# Patient Record
Sex: FEMALE | Race: WHITE | NOT HISPANIC OR LATINO | Employment: UNEMPLOYED | URBAN - METROPOLITAN AREA
[De-identification: names, ages, dates, MRNs, and addresses within clinical notes are randomized per-mention and may not be internally consistent; named-entity substitution may affect disease eponyms.]

---

## 2017-09-11 PROBLEM — M54.16 LUMBAR RADICULOPATHY: Status: ACTIVE | Noted: 2017-09-11

## 2018-08-03 PROBLEM — M25.561 ACUTE PAIN OF RIGHT KNEE: Status: ACTIVE | Noted: 2018-08-03

## 2019-10-10 PROBLEM — E03.9 ACQUIRED HYPOTHYROIDISM: Status: ACTIVE | Noted: 2019-10-10

## 2019-10-10 PROBLEM — I36.1 NONRHEUMATIC TRICUSPID VALVE REGURGITATION: Status: ACTIVE | Noted: 2019-10-10

## 2019-10-10 PROBLEM — Z78.9 STATIN INTOLERANCE: Status: ACTIVE | Noted: 2019-10-10

## 2019-10-10 PROBLEM — Z87.898 HISTORY OF SEIZURE: Status: ACTIVE | Noted: 2019-10-10

## 2019-10-10 PROBLEM — I10 ESSENTIAL HYPERTENSION: Status: ACTIVE | Noted: 2019-10-10

## 2019-10-10 PROBLEM — M41.9 SCOLIOSIS OF THORACOLUMBAR SPINE: Status: ACTIVE | Noted: 2019-10-10

## 2019-10-10 PROBLEM — F32.9 REACTIVE DEPRESSION: Status: ACTIVE | Noted: 2019-10-10

## 2019-10-10 PROBLEM — E78.00 HYPERCHOLESTEROLEMIA: Status: ACTIVE | Noted: 2019-10-10

## 2020-01-02 PROBLEM — C50.911 INVASIVE DUCTAL CARCINOMA OF BREAST, FEMALE, RIGHT: Status: ACTIVE | Noted: 2020-01-02

## 2020-01-21 PROBLEM — C50.411 MALIGNANT NEOPLASM OF UPPER-OUTER QUADRANT OF RIGHT FEMALE BREAST: Status: ACTIVE | Noted: 2020-01-21

## 2020-02-13 ENCOUNTER — TELEPHONE (OUTPATIENT)
Dept: HEMATOLOGY/ONCOLOGY | Facility: CLINIC | Age: 60
End: 2020-02-13

## 2020-02-16 PROBLEM — N61.0 INFECTION OF RIGHT BREAST: Status: ACTIVE | Noted: 2020-02-16

## 2020-02-20 PROBLEM — N64.89 SEROMA OF BREAST: Status: ACTIVE | Noted: 2020-02-20

## 2020-03-23 ENCOUNTER — OFFICE VISIT (OUTPATIENT)
Dept: HEMATOLOGY/ONCOLOGY | Facility: CLINIC | Age: 60
End: 2020-03-23
Payer: COMMERCIAL

## 2020-03-23 DIAGNOSIS — D64.9 ANEMIA, UNSPECIFIED TYPE: ICD-10-CM

## 2020-03-23 DIAGNOSIS — D75.839 THROMBOCYTOSIS: ICD-10-CM

## 2020-03-23 DIAGNOSIS — Z17.0 MALIGNANT NEOPLASM OF UPPER-OUTER QUADRANT OF RIGHT BREAST IN FEMALE, ESTROGEN RECEPTOR POSITIVE: Primary | ICD-10-CM

## 2020-03-23 DIAGNOSIS — C50.411 MALIGNANT NEOPLASM OF UPPER-OUTER QUADRANT OF RIGHT BREAST IN FEMALE, ESTROGEN RECEPTOR POSITIVE: Primary | ICD-10-CM

## 2020-03-23 PROCEDURE — 99205 OFFICE O/P NEW HI 60 MIN: CPT | Mod: 95,,, | Performed by: INTERNAL MEDICINE

## 2020-03-23 PROCEDURE — 99205 PR OFFICE/OUTPT VISIT, NEW, LEVL V, 60-74 MIN: ICD-10-PCS | Mod: 95,,, | Performed by: INTERNAL MEDICINE

## 2020-03-23 RX ORDER — ANASTROZOLE 1 MG/1
1 TABLET ORAL DAILY
Qty: 90 TABLET | Refills: 3 | Status: SHIPPED | OUTPATIENT
Start: 2020-03-23 | End: 2020-05-18

## 2020-03-23 NOTE — PROGRESS NOTES
"  INITIAL CONSULTATION     DATE: 03/23/2020  Patient Name: Ricardo Perera  Reason for referral:  Breast cancer    The patient location is:  home  The chief complaint leading to consultation is:  New breast cancer diagnosis, initial consult  Visit type: Virtual visit with synchronous audio and video  Total time spent with patient:  60 min  Each patient to whom he or she provides medical services by telemedicine is:  (1) informed of the relationship between the physician and patient and the respective role of any other health care provider with respect to management of the patient; and (2) notified that he or she may decline to receive medical services by telemedicine and may withdraw from such care at any time.        Subjective:       Patient ID: Ricardo Perera is a 59 y.o. female.      [facility]       Oncology history for review prior to visit:   11/25/2019 mammogram right breast shows 15 mm equal density irregularly-shaped mass with indistinct margins.  Right breast ultrasound shows 15 x 9 x 11 mm irregular shaped hypoechoic mass with angular margins.    11/26/2019-right breast core needle biopsy   Pathology:  Invasive duct carcinoma intermediate grade.   estrogen receptor 99.5% positive, AZ in 98.8% positive, Ki 67 low.  HER2 negative     12/23/2019-[facility] diagnostic testing results-MUTYH heterozygous gene positive   (one daughter positive for this gene age 28 and 34 negative).   12/27/2019-MRI breast bilateral-right breast shows 1.4 x 1.0 x 1.1 cm mass at posterior 9:00 a.m..  Left breast shows no evidence of malignancy  01/02/2020-PCP visit-review of clinic visit shows "Pt has been under anesthesia before for a 9 hour back surgery and tolerated well. Pt has stress test and echo previously that was normal prior to that surgery per pt report. (Do not have this record on chart).    Pt is not on aspirin but takes BC power for headaches.   + family Hx mother and Father CAD at 59 years old and father had bypass surgery. " "  Pt has appt to see GI. Due to + MUTYH for a screening colonoscopy. Pt is negative for BRCA. "    01/21/2020-right breast nipple sparing mastectomy and right axillary sentinel lymph node excision, left breast nipple sparing mastectomy   Pathology:  Right breast In situ and invasive duct carcinoma 0.8 cm low grade.  Margins negative for tumor.  One lymph node no tumor seen.  PT1b pN0    Left breast shows invasive duct carcinoma 0.12 cm upper outer quadrant, margins negative.  Focal ALH.  pT1a pNx  01/21/2020-ER positive 99.5% NM positive 99.5% HER2 negative.  Ki 67 is 6.5%, favorable  Implants were done. She had surgery that night as well for hematoma. Left breast    02/03/20204748-Riyyfkge-krhqpintsf score result 11, distant recurrence risk at 9 years with AI or tamoxifen alone is 3% an average absolute chemotherapy benefit is less than 1%. (oncotype sent on initial dx right breast. Left breast incidental finding malignancy very small, Presented in tumor board, in agreement omit SLND).  02/07/2020 surgery postop visit-    2/16/20 - plastic surgery visit, red swollen breast - had already been on abx since 1/21 - her drains had been in three weeks per her report - she was then placed on 3 types of abx and was admitted to hospital and given IV abx and had surgery - cultures on 2/18 grew finegoldia magna. Replaced the implant - Right    03/23/2020-initial medical oncology consult  She confirms hx above. She felt mass on SBE by accident just prior to her MMG diag above.   Reports just finishing abx recent    PMH - lost weight 2019 from anxiety with daughter getting .she lost 15lbs. She went to PCP and they did eval for unintentional weight loss - Ct a/p, LN identfied in neck and saw specialists (and those have been presnet 4 years) and thought benign.   She is very active due to handicap daughter.     HTN, kideny stones X 3 and lithotripsy, cholesterol.     Has back "problem", had "T lift" surgery, lamniecotmy and " "fusion in 12/2014, 10+ "cage and screws, plates") - still has pain and has back curve in her back, she refused surgery.     She has a knee with severe OA, R, R hip pain result of how she walks from the knee.   She is told needs TKA. She has seen Dr. Tavarez and recommended for TKA.  She takes pain med for this - norco - Dr. Christianson - shots in joints.   Daily she is not in "terrible pain" but often, and has to rest more.     No hysterectomy. Went through menopause -   She was on BC that stopped periods so hard for her to say. Stopped BC age 58yo and then placed on HRT - this was stopped when saw surgeon. She reports some menopausal symptoms, jiménez. Hot flashes middle of the night.   She was told her ovaries are normal for her age on recent imaging.     HA mild chronic unchanged.   No GI complaint except on abx recent diarrhea.     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 They just got lift they are hioping    Sometimes few days of exacerbation - needs ice and heat when this occurs.    She goes to pain doctor, Dr. Christianson, who gives injections and pain meds. She is told this is going to hurt no matter waht.     lexapro routine and ativan prn not often.     37yo is paraplegic and she is primary caregiver. Lives with hsuband and daughter she cares for  No smoking or drinking.   No family history colon ca or polyps.   2 paternal aunts had breast cancer.   See gene testing above. Discussed in detail and counseling.      Past Medical History:   Diagnosis Date    Anemia     Anxiety     Arthralgia     Chest pain     Depression     Encounter for blood transfusion     H/O bronchitis     History of kidney stones     Hyperlipidemia     Hypertension     Hypothyroidism     PONV (postoperative nausea and vomiting)     Renal tubular acidosis 2016    Scoliosis     Seizures     x 1 age 45 due to HBP    Stress fracture of foot     Tachycardia     TMJ (dislocation of " temporomandibular joint)      Past Surgical History:   Procedure Laterality Date    BACK SURGERY      TLIF    BREAST RECONSTRUCTION Bilateral 2020    Procedure: RECONSTRUCTION, BREAST - with Immediate Implants;  Surgeon: Lou Maurice MD;  Location: [facility] OR;  Service: Plastics;  Laterality: Bilateral;     SECTION      EVACUATION OF HEMATOMA Left 2020    Procedure: EVACUATION, HEMATOMA;  Surgeon: Lou Maurice MD;  Location: [facility];  Service: Plastics;  Laterality: Left;    EXTRACORPOREAL SHOCK WAVE LITHOTRIPSY      x3    INCISION AND DRAINAGE OF BREAST Right 2020    Procedure: INCISION AND DRAINAGE, BREAST;  Surgeon: Lou Maurice MD;  Location: [facility];  Service: Plastics;  Laterality: Right;    INJECTION OF ANESTHETIC AGENT AROUND NERVE Right 8/3/2018    Procedure: BLOCK, NERVE/ Geniculate, Knee;  Surgeon: Mike Christianson MD;  Location: [facility];  Service: Pain Management;  Laterality: Right;    INSERTION OF BREAST IMPLANT Right 2020    Procedure: INSERTION, BREAST IMPLANT;  Surgeon: Lou Maurice MD;  Location: [facility];  Service: Plastics;  Laterality: Right;    KIDNEY STONE SURGERY      KNEE ARTHROSCOPY      REMOVAL OF BREAST IMPLANT Right 2020    Procedure: REMOVAL, IMPLANT, BREAST;  Surgeon: Lou Maurice MD;  Location: [facility];  Service: Plastics;  Laterality: Right;    SENTINEL LYMPH NODE BIOPSY Right 2020    Procedure: BIOPSY, LYMPH NODE, SENTINEL;  Surgeon: Paty Cheek MD;  Location: [facility];  Service: General;  Laterality: Right;    SIMPLE MASTECTOMY Bilateral 2020    Procedure: MASTECTOMY, SIMPLE;  Surgeon: Paty Cheek MD;  Location: [facility];  Service: General;  Laterality: Bilateral;    TONSILLECTOMY       Social History     Socioeconomic History    Marital status:      Spouse name: Not on file    Number of children: Not on file    Years of education: Not on file    Highest education level: Not on  file   Occupational History    Not on file   Social Needs    Financial resource strain: Not on file    Food insecurity:     Worry: Not on file     Inability: Not on file    Transportation needs:     Medical: Not on file     Non-medical: Not on file   Tobacco Use    Smoking status: Never Smoker    Smokeless tobacco: Never Used   Substance and Sexual Activity    Alcohol use: No     Alcohol/week: 0.0 standard drinks    Drug use: Never    Sexual activity: Yes     Partners: Male     Birth control/protection: None, Post-menopausal   Lifestyle    Physical activity:     Days per week: Not on file     Minutes per session: Not on file    Stress: To some extent   Relationships    Social connections:     Talks on phone: Not on file     Gets together: Not on file     Attends Yazdanism service: Not on file     Active member of club or organization: Not on file     Attends meetings of clubs or organizations: Not on file     Relationship status: Not on file   Other Topics Concern    Not on file   Social History Narrative    Not on file     Family History   Problem Relation Age of Onset    Hyperlipidemia Mother     Heart disease Mother     COPD Mother     Lung cancer Mother     Depression Father     Hyperlipidemia Father     Hypertension Father     Nephrolithiasis Father     Kidney disease Father     Heart disease Father     Cancer Paternal Aunt     Breast cancer Paternal Aunt 33    Ovarian cancer Maternal Aunt 73    Cancer Maternal Aunt     Breast cancer Paternal Aunt 68    Cancer Paternal Aunt        Current Outpatient Medications   Medication Sig Dispense Refill    doxycycline (VIBRA-TABS) 100 MG tablet Take 1 tablet (100 mg total) by mouth every 12 (twelve) hours. 28 tablet 0    escitalopram oxalate (LEXAPRO) 20 MG tablet TAKE 1 TABLET BY MOUTH EVERY DAY 90 tablet 4    gabapentin (NEURONTIN) 300 MG capsule TAKE ONE CAPSULE BY MOUTH EVERY EVENING. 1 TO 2 CAPSULES EVERY NIGHT AT BEDTIME 90 capsule  "0    hydroCHLOROthiazide (MICROZIDE) 12.5 mg capsule TAKE 1 CAPSULE(12.5 MG) BY MOUTH EVERY DAY 90 capsule 0    hydrocodone-acetaminophen 10-325mg (NORCO)  mg Tab Take 1 tablet by mouth every 6 (six) hours as needed for Pain. 60 tablet 0    irbesartan (AVAPRO) 150 MG tablet TAKE 1/2 TABLET BY MOUTH EVERY DAY AND 1 TABLET EVERY EVENING AS DIRECTED (Patient taking differently: Take 150 mg by mouth once daily. ) 135 tablet 3    L.acidophil,parac-S.therm-Bif. (RISAQUAD) Cap capsule Take 1 capsule by mouth once daily.      levothyroxine (SYNTHROID) 25 MCG tablet TAKE 1 TABLET BY MOUTH EVERY DAY 90 tablet 4    LORazepam (ATIVAN) 0.5 MG tablet TAKE 1 TABLET BY MOUTH TWICE DAILY AS NEEDED FOR ANXIETY 60 tablet 3    mupirocin (BACTROBAN) 2 % ointment by Nasal route 2 (two) times daily. 1 Tube 0    ondansetron (ZOFRAN-ODT) 4 MG TbDL ONDANSETRON 4 MG TBDP      pravastatin (PRAVACHOL) 80 MG tablet TAKE 1 TABLET BY MOUTH EVERY NIGHT AT BEDTIME 90 tablet 3     No current facility-administered medications for this visit.      ALLERGIES REVIEWED    Review of Systems    Constitutional: Negative for activity change, appetite change, negative fatigue, fever and unexpected weight change.   HENT: Negative for mouth sores and sore throat.    Respiratory: Negative for cough and shortness of breath.    Cardiovascular: Negative for chest pain, palpitations and leg swelling.   Gastrointestinal: Negative for abdominal pain, constipation and diarrhea.   Genitourinary: Negative for dysuria and frequency.   Musculoskeletal: +for back pain and joint swelling. See HPI  Neurological: Negative for weakness, light-headedness and numbness.   Hematological: Does not bruise/bleed easily.   Skin: no rash, no lesions, no itching    Objective:    Height - 5'1"    LMP 12/12/2015 (Approximate) Comment: spotting    Wt Readings from Last 25 Encounters:   02/16/20 53.4 kg (117 lb 11.6 oz)   01/21/20 53.1 kg (117 lb 1 oz)   01/13/20 53.1 kg (117 " lb 1 oz)   01/02/20 53.1 kg (117 lb)   11/26/19 51.7 kg (114 lb)   11/25/19 54.4 kg (120 lb)   10/14/19 54.7 kg (120 lb 9.6 oz)   05/08/19 54 kg (119 lb 1.6 oz)   08/29/18 60.3 kg (133 lb)   07/30/18 60.3 kg (133 lb)   07/13/18 59.9 kg (132 lb)   09/11/17 59.9 kg (132 lb)   12/08/16 62.4 kg (137 lb 8 oz)   08/01/16 61.7 kg (136 lb)   12/14/15 62.7 kg (138 lb 3.2 oz)       Physical Exam    Constitutional: She is oriented to person, place, and time. No distress.   HENT:  Conjunctivae and EOM are normal. No asymmetry.  Neck: Normal range of motion. Neck supple.   Neurological: She is alert and oriented to person, place, and time. No cranial nerve deficit visible  Skin: no visible rash  Psychiatric: She has a normal mood and affect. Her speech is normal.     No results found for this or any previous visit (from the past 72 hour(s)).    Assessment:       1. Malignant neoplasm of upper-outer quadrant of right breast in female, estrogen receptor positive      Right breast - stage IA  Left breast - stage IA    ECOG SCORE         ECOG 1-2    Patient is a 59 y.o. year old female with new diagnosis bilateral breast cancer after right breast mass felt on SBE and identified on MMG/biopsy, now s/p Right mastectomy, and left breast cancer identified incidentally at time of prophylactic left mastectomy.       Discussed diagnosis, staging, receptor status, various treatments including surgery, chemotherapy, radiation, endocrine therapy. Discussed recommended treatment plan and benefits/risks, rationale for recommendations based on NCCN guidelines. Patient and family expressed understanding and questions answered to their satisfaction.    For this patient, low oncotype result on 8mm IDC of right breast which is good news.     Discussed pros/cons adjvuant AI minimum 5 years and re-eval, consider 10 years at that time and rationale.   Discussed side effects, benefit/risk of AI. Concern with her baseline chronic joint aches and pains,  she expressed good understanding of possible SE of AI and we will re-eval closely for arthrlagia  Discussed bone health and Vit D/calcium.     Discussed benefits of exercise and healthy body weight (BMI<25) in decreasing risk of breast cancer recurrence and advised exercise and diet, she expressed understanding.       Also reviewed her labs, thrombocytosis, anemia after surgery in detail and evaluation recommended, labs.     Discussed coping with cancer diagnosis and support given.     Discussed exercise, nutrition.           Plan:       Diagnoses and all orders for this visit:    Malignant neoplasm of upper-outer quadrant of right breast in female, estrogen receptor positive            Start arimidex    Labs to be done in next 1-3 mo pending covid-19 status - cbc, cmp, Vit D, ferritin, iron profile due to long standing thrombocytosis, anemia after surgery notable recent, Vit D screening start of AI    Start vit d/calcium OTC.     MD vis in 5-6 weeks telemed follow-up on arimidex start      Keep follow-ups with PCP, Gyn, surgery    Exercise/nutrition      Discussed plan above in great detail with patient and all questions answered to their satisfaction. Proceed with plan above.       Thank you for the referral. Please call with any questions.           Brandi Bolaños M.D.  Hematology Oncology  [facility]

## 2020-05-14 ENCOUNTER — TELEPHONE (OUTPATIENT)
Dept: HEMATOLOGY/ONCOLOGY | Facility: CLINIC | Age: 60
End: 2020-05-14

## 2020-05-14 NOTE — TELEPHONE ENCOUNTER
spoke to patient she is having lots of bone and waking up at night with numb hands.  Made appointment to see  On Monday.

## 2020-05-14 NOTE — TELEPHONE ENCOUNTER
----- Message from Cally John sent at 5/14/2020 12:14 PM CDT -----  Patient is calling regarding side effects of her medications.  Please meredith her at [phone].   Thank you!

## 2020-05-18 ENCOUNTER — OFFICE VISIT (OUTPATIENT)
Dept: HEMATOLOGY/ONCOLOGY | Facility: CLINIC | Age: 60
End: 2020-05-18
Payer: COMMERCIAL

## 2020-05-18 DIAGNOSIS — M19.90 OSTEOARTHRITIS, UNSPECIFIED OSTEOARTHRITIS TYPE, UNSPECIFIED SITE: ICD-10-CM

## 2020-05-18 DIAGNOSIS — C50.912 MALIGNANT NEOPLASM OF LEFT BREAST IN FEMALE, ESTROGEN RECEPTOR POSITIVE, UNSPECIFIED SITE OF BREAST: ICD-10-CM

## 2020-05-18 DIAGNOSIS — Z17.0 MALIGNANT NEOPLASM OF LEFT BREAST IN FEMALE, ESTROGEN RECEPTOR POSITIVE, UNSPECIFIED SITE OF BREAST: ICD-10-CM

## 2020-05-18 DIAGNOSIS — G89.29 CHRONIC LOW BACK PAIN WITHOUT SCIATICA, UNSPECIFIED BACK PAIN LATERALITY: ICD-10-CM

## 2020-05-18 DIAGNOSIS — T45.1X5A AROMATASE INHIBITOR-ASSOCIATED ARTHRALGIA: ICD-10-CM

## 2020-05-18 DIAGNOSIS — Z17.0 MALIGNANT NEOPLASM OF UPPER-OUTER QUADRANT OF RIGHT BREAST IN FEMALE, ESTROGEN RECEPTOR POSITIVE: Primary | ICD-10-CM

## 2020-05-18 DIAGNOSIS — M25.50 AROMATASE INHIBITOR-ASSOCIATED ARTHRALGIA: ICD-10-CM

## 2020-05-18 DIAGNOSIS — C50.411 MALIGNANT NEOPLASM OF UPPER-OUTER QUADRANT OF RIGHT BREAST IN FEMALE, ESTROGEN RECEPTOR POSITIVE: Primary | ICD-10-CM

## 2020-05-18 DIAGNOSIS — R53.83 FATIGUE, UNSPECIFIED TYPE: ICD-10-CM

## 2020-05-18 DIAGNOSIS — M54.50 CHRONIC LOW BACK PAIN WITHOUT SCIATICA, UNSPECIFIED BACK PAIN LATERALITY: ICD-10-CM

## 2020-05-18 PROCEDURE — 99024 PR POST-OP FOLLOW-UP VISIT: ICD-10-PCS | Mod: 95,,, | Performed by: INTERNAL MEDICINE

## 2020-05-18 PROCEDURE — 99214 PR OFFICE/OUTPT VISIT, EST, LEVL IV, 30-39 MIN: ICD-10-PCS | Mod: 95,,, | Performed by: INTERNAL MEDICINE

## 2020-05-18 PROCEDURE — 99214 OFFICE O/P EST MOD 30 MIN: CPT | Mod: 95,,, | Performed by: INTERNAL MEDICINE

## 2020-05-18 PROCEDURE — 99024 POSTOP FOLLOW-UP VISIT: CPT | Mod: 95,,, | Performed by: INTERNAL MEDICINE

## 2020-05-18 RX ORDER — EXEMESTANE 25 MG/1
25 TABLET ORAL DAILY
Qty: 30 TABLET | Refills: 3 | Status: SHIPPED | OUTPATIENT
Start: 2020-05-18 | End: 2021-06-28

## 2020-05-18 NOTE — PROGRESS NOTES
"FOLLOW-UP VISIT    Patient name: Ricardo Perera  Date: 5/18/20    TELEMEDICINE  The patient location is: home  The chief complaint leading to consultation is: breast ca surveillance, SE with AI  Visit type: Virtual visit with synchronous audio and video  Total time spent with patient: 25 min  Each patient to whom he or she provides medical services by telemedicine is:  (1) informed of the relationship between the physician and patient and the respective role of any other health care provider with respect to management of the patient; and (2) notified that he or she may decline to receive medical services by telemedicine and may withdraw from such care at any time.        Subjective:       Patient ID: Ricardo Perera is a 59 y.o. female.      HPI  Reports today started her AI severe shoulder pains then hips daily. This started 3 weeks after start of med.   Hands felt swollen and stiff and would have to do lots of activity    At night, arm going numb and tingling to hand and waking her.   She reports brain fog also, feels could be pandemic stress also.   She reports "bone deep pain" and spoke with pain mgmt doctor also.     She is not stop that medication just continues to feel notable pain daily interfering with quality of life in activities of daily life    She is not exercising, has tried to start walking without much success and plans are to get a bike and use the bike outside for exercise.  Positive reinforcement given we discussed in great detail how much this may benefit her including decrease her breast cancer risk of recurrence, increased muscle strength to support her joints and back in decreased pain, decreased risk of further side effect with aromatase inhibitor, mental health and stress relief    We reviewed her other medication options including alternative AI versus SERM, tamoxifen.  We reviewed her MUTYH gene mutation and she has anxiety about additional cancer risk, we reviewed increased risk of " "uterine cancer with tamoxifen and benefit risk ratio in the context of breast cancer and that specifically uterine cancer is not increased incidence with MUTYH gene mutation.      To review she cares for her daughter in home who is disabled, this continues.     See detailed oncology history below, updated with most recent scans, labs, pertinent medical history.      Invasive ductal carcinoma of breast, female, right    1/2/2020 Initial Diagnosis     Invasive ductal carcinoma of breast, female, right    Oncology history for review:   11/25/2019 mammogram right breast shows 15 mm equal density irregularly-shaped mass with indistinct margins.  Right breast ultrasound shows 15 x 9 x 11 mm irregular shaped hypoechoic mass with angular margins.     11/26/2019-right breast core needle biopsy              Pathology:  Invasive duct carcinoma intermediate grade.              estrogen receptor 99.5% positive, [place] in 98.8% positive, Ki 67 low.  HER2 negative      12/23/2019-[facility] diagnostic testing results-MUTYH heterozygous gene positive              (one daughter positive for this gene age 28 and 34 negative).   12/27/2019-MRI breast bilateral-right breast shows 1.4 x 1.0 x 1.1 cm mass at posterior 9:00 a.m..  Left breast shows no evidence of malignancy  01/02/2020-PCP visit-review of clinic visit shows "Pt has been under anesthesia before for a 9 hour back surgery and tolerated well. Pt has stress test and echo previously that was normal prior to that surgery per pt report.   Pt is not on aspirin but takes BC power for headaches.   + family Hx mother and Father CAD at 59 years old and father had bypass surgery.   Pt has appt to see GI. Due to + MUTYH for a screening colonoscopy. Pt is negative for BRCA. "     01/21/2020-right breast nipple sparing mastectomy and right axillary sentinel lymph node excision, left breast nipple sparing mastectomy              Pathology:  Right breast In situ and invasive duct carcinoma 0.8 cm " low grade.  Margins negative for tumor.  One lymph node no tumor seen.  pT1b pN0                          Left breast shows invasive duct carcinoma 0.12 cm upper outer quadrant, margins negative.  Focal ALH.  pT1a pNx  01/21/2020-ER positive 99.5% IN positive 99.5% HER2 negative.  Ki 67 is 6.5%, favorable  Implants were done. She had surgery that night as well for hematoma. Left breast     02/03/20207409-Uafquwmz-xjjhbudmyq score result 11, distant recurrence risk at 9 years with AI or tamoxifen alone is 3% an average absolute chemotherapy benefit is less than 1%. (oncotype sent on initial dx right breast. Left breast incidental finding malignancy very small, Presented in tumor board, in agreement omit SLND).  02/07/2020 surgery postop visit-     2/16/20 - plastic surgery visit, red swollen breast - had already been on abx since 1/21 - her drains had been in three weeks per her report - she was then placed on 3 types of abx and was admitted to hospital and given IV abx and had surgery - cultures on 2/18 grew finegoldia magna. Replaced the implant - Right     03/23/2020-initial medical oncology consult. Start AI  5/18/20 - med onc follow-up         I have reviewed my initial consult note with detailed PMH/ROS from initial encounter and all following progress notes.   Past medical, surgical, family, and social history were reviewed today and there are no changes of note unless mentioned in HPI.     MEDS and ALLERGIES were reviewed with patient and meds reconciled.     Fatigue  Continues, not worse    Weight/appetite  Good    Constitutional  Denies F/C    Pain  Yes see HPI   Sleep  Ok    Mucositis  Denies    Cardiovascular  Denies CP    Respiratory  Denies SOB    Nausea  Denies    BMs  Normal    GI  Denies abdominal pain    Bleeding  Denies epistaxis, hemoptysis, BRBPR, melena, hematuria or any other bleeding    Extremities  Denies edema    Skin/nail/hair  Denies toxicity    Neuropathy  Denies    Psych  In good spirits           Objective:      LMP 12/12/2015 (Approximate) Comment: spotting   Wt Readings from Last 30 Encounters:   02/16/20 53.4 kg (117 lb 11.6 oz)   01/21/20 53.1 kg (117 lb 1 oz)   01/13/20 53.1 kg (117 lb 1 oz)   01/02/20 53.1 kg (117 lb)   11/26/19 51.7 kg (114 lb)   11/25/19 54.4 kg (120 lb)   10/14/19 54.7 kg (120 lb 9.6 oz)   05/08/19 54 kg (119 lb 1.6 oz)   08/29/18 60.3 kg (133 lb)   07/30/18 60.3 kg (133 lb)   07/13/18 59.9 kg (132 lb)   09/11/17 59.9 kg (132 lb)   12/08/16 62.4 kg (137 lb 8 oz)   08/01/16 61.7 kg (136 lb)   12/14/15 62.7 kg (138 lb 3.2 oz)       Constitutional: Patient is oriented, No distress. Calm. Normal speech.  She is well appearing  HENT: NCAT, Conjunctivae and EOM are normal.   Neurological: patient is alert and oriented to person, place, and time. No cranial nerve deficit visible.  Skin: no visible rash  Psychiatric: patient has a normal mood and affect. Her speech is normal.         Assessment:       1. Malignant neoplasm of upper-outer quadrant of right breast in female, estrogen receptor positive    2. Aromatase inhibitor-associated arthralgia    3. Fatigue, unspecified type    4. Malignant neoplasm of left breast in female, estrogen receptor positive, unspecified site of breast    5. Osteoarthritis, unspecified osteoarthritis type, unspecified site    6. Chronic low back pain without sciatica, unspecified back pain laterality      Cancer Staging  Stage IA left breast  Stage IA right breast    ECOG 1-2     Patient is a 59 y.o. year old female with new diagnosis bilateral breast cancer after right breast mass felt on SBE and identified on MMG/biopsy, now s/p Right mastectomy, and left breast cancer identified incidentally at time of prophylactic left mastectomy.         Discussed diagnosis, staging, receptor status, various treatments including surgery, chemotherapy, radiation, endocrine therapy. Discussed recommended treatment plan and benefits/risks, rationale for  recommendations based on NCCN guidelines. Patient and family expressed understanding and questions answered to their satisfaction.     For this patient, low oncotype result on 8mm IDC of right breast which is good news.      Discussed pros/cons adjvuant AI minimum 5 years and re-eval, consider 10 years at that time and rationale.   Discussed side effects, benefit/risk of AI. Concern with her baseline chronic joint aches and pains, she expressed good understanding of possible SE of AI and we will re-eval closely for arthrlagia  Discussed bone health and Vit D/calcium.      Discussed benefits of exercise and healthy body weight (BMI<25) in decreasing risk of breast cancer recurrence and advised exercise and diet, she expressed understanding.        Also reviewed her labs, thrombocytosis, anemia after surgery in detail and evaluation recommended, labs.      Discussed coping with cancer diagnosis and support given.      Discussed exercise, nutrition.     Today 05/18/2020, patient is having intolerance to aromatase inhibitor affecting quality of life and ADLs, discussed in great detail in all other medication options and rationale and as it pertains to her personal risk of recurrence which is overall low but present.  Plan to stop Current AI followed by start exercise and rechallenge with aromasin in 3-4 weeks. She also hasn't obtained lab work we discussed due to pandemic, to eval her thrombocytosis/anemia, she will contact us when leaving home and feels comfortable doing so.      Discussed COVID-19 and social distancing in great detail, avoid all non-essential visits out of the home if possible and avoid sick contacts.         Plan:       Ricardo was seen today for follow-up and pain.    Diagnoses and all orders for this visit:    Malignant neoplasm of upper-outer quadrant of right breast in female, estrogen receptor positive  -     exemestane (AROMASIN) 25 mg tablet; Take 1 tablet (25 mg total) by mouth once  daily.    Aromatase inhibitor-associated arthralgia    Fatigue, unspecified type    Malignant neoplasm of left breast in female, estrogen receptor positive, unspecified site of breast    Osteoarthritis, unspecified osteoarthritis type, unspecified site    Chronic low back pain without sciatica, unspecified back pain laterality            Stop arimidex    Off med three weeks and start aromasin, sent to pharm.     Inc exercise - Exercise/nutrition important, reviewed    MD vis in 1st two weeks of July to review tolerance/side effects.     Continue current medications otherwise, reviewed.      Important to keep follow-up with PCP and pain mgmt and gyn and surgery.     Discussed plan above in great detail with patient and all questions answered to their satisfaction. Proceed with plan above.          Brandi Bolaños M.D.  Hematology Oncology  [facility]

## 2020-07-07 ENCOUNTER — OFFICE VISIT (OUTPATIENT)
Dept: HEMATOLOGY/ONCOLOGY | Facility: CLINIC | Age: 60
End: 2020-07-07
Payer: COMMERCIAL

## 2020-07-07 DIAGNOSIS — R53.83 FATIGUE, UNSPECIFIED TYPE: ICD-10-CM

## 2020-07-07 DIAGNOSIS — G56.03 BILATERAL CARPAL TUNNEL SYNDROME: ICD-10-CM

## 2020-07-07 DIAGNOSIS — M19.90 OSTEOARTHRITIS, UNSPECIFIED OSTEOARTHRITIS TYPE, UNSPECIFIED SITE: ICD-10-CM

## 2020-07-07 DIAGNOSIS — E61.1 IRON DEFICIENCY: ICD-10-CM

## 2020-07-07 DIAGNOSIS — Z17.0 MALIGNANT NEOPLASM OF LEFT BREAST IN FEMALE, ESTROGEN RECEPTOR POSITIVE, UNSPECIFIED SITE OF BREAST: ICD-10-CM

## 2020-07-07 DIAGNOSIS — C50.912 MALIGNANT NEOPLASM OF LEFT BREAST IN FEMALE, ESTROGEN RECEPTOR POSITIVE, UNSPECIFIED SITE OF BREAST: ICD-10-CM

## 2020-07-07 DIAGNOSIS — Z17.0 MALIGNANT NEOPLASM OF UPPER-OUTER QUADRANT OF RIGHT BREAST IN FEMALE, ESTROGEN RECEPTOR POSITIVE: Primary | ICD-10-CM

## 2020-07-07 DIAGNOSIS — M25.50 AROMATASE INHIBITOR-ASSOCIATED ARTHRALGIA: ICD-10-CM

## 2020-07-07 DIAGNOSIS — T45.1X5A AROMATASE INHIBITOR-ASSOCIATED ARTHRALGIA: ICD-10-CM

## 2020-07-07 DIAGNOSIS — C50.411 MALIGNANT NEOPLASM OF UPPER-OUTER QUADRANT OF RIGHT BREAST IN FEMALE, ESTROGEN RECEPTOR POSITIVE: Primary | ICD-10-CM

## 2020-07-07 PROCEDURE — 99215 PR OFFICE/OUTPT VISIT, EST, LEVL V, 40-54 MIN: ICD-10-PCS | Mod: 95,,, | Performed by: INTERNAL MEDICINE

## 2020-07-07 PROCEDURE — 99215 OFFICE O/P EST HI 40 MIN: CPT | Mod: 95,,, | Performed by: INTERNAL MEDICINE

## 2020-07-07 RX ORDER — HEPARIN 100 UNIT/ML
500 SYRINGE INTRAVENOUS
Status: CANCELLED | OUTPATIENT
Start: 2020-07-13

## 2020-07-07 RX ORDER — SODIUM CHLORIDE 0.9 % (FLUSH) 0.9 %
10 SYRINGE (ML) INJECTION
Status: CANCELLED | OUTPATIENT
Start: 2020-07-13

## 2020-07-07 NOTE — PROGRESS NOTES
"FOLLOW-UP VISIT    Patient name: Ricardo Perera  Date: 7/7/20    The patient location is: home  The chief complaint leading to consultation is: followup on AI, side effects    Visit type: audiovisual then converted to audio after poor connection.     50 minutes of total time spent on the encounter, which includes face to face time and non-face to face time preparing to see the patient (eg, review of tests), Obtaining and/or reviewing separately obtained history, Documenting clinical information in the electronic or other health record, Independently interpreting results (not separately reported) and communicating results to the patient/family/caregiver, or Care coordination (not separately reported).       Each patient to whom he or she provides medical services by telemedicine is:  (1) informed of the relationship between the physician and patient and the respective role of any other health care provider with respect to management of the patient; and (2) notified that he or she may decline to receive medical services by telemedicine and may withdraw from such care at any time.      Subjective:       Patient ID: Ricardo Perera is a 59 y.o. female.    Chief Complaint: follow-up on AI for breast ca, iron def new dx    HPI    On 5/18/20 - Reports today started her AI severe shoulder pains then hips daily. This started 3 weeks after start of med.   Hands felt swollen and stiff and would have to do lots of activity     At night, arm going numb and tingling to hand and waking her.   She reports brain fog also, feels could be pandemic stress also.   She reports "bone deep pain" and spoke with pain mgmt doctor also.    We then stopped AI, stop for three weeks, restart aromasin.    TODAY 07/07/2020 - has been on aromasin, symptoms recurred in 2 weeks.   She has carpal tunnel new diagnosis after orthoped visit today. Right hand more than left but both with symptoms. Has braces for both. Severe OA, she reports orthopedic " "surprised she could ambulate at all.  Also her chronic back issues.    Long discussion about this and anxiety about medical problems and cancer recurrence.     In regards to new dx iron def - she had iron def in past, 2009, had IV iron.  She recalls the following:  She reports severe low K, 1.9, couldn't move and went to ED - had replacement.   She reports she thinks this was related to a kidney problem.   She was then on oral potassium after this.   She then had iron infusions after this time.     See detailed oncology history below, updated with most recent scans, labs, pertinent medical history.   Oncology History   Invasive ductal carcinoma of breast, female, right   1/2/2020 Initial Diagnosis    Invasive ductal carcinoma of breast, female, right    Oncology history for review:   11/25/2019 mammogram right breast shows 15 mm equal density irregularly-shaped mass with indistinct margins.  Right breast ultrasound shows 15 x 9 x 11 mm irregular shaped hypoechoic mass with angular margins.     11/26/2019-right breast core needle biopsy              Pathology:  Invasive duct carcinoma intermediate grade.              estrogen receptor 99.5% positive, [place] in 98.8% positive, Ki 67 low.  HER2 negative      12/23/2019-[facility] diagnostic testing results-MUTYH heterozygous gene positive              (one daughter positive for this gene age 28 and 34 negative).   12/27/2019-MRI breast bilateral-right breast shows 1.4 x 1.0 x 1.1 cm mass at posterior 9:00 a.m..  Left breast shows no evidence of malignancy  01/02/2020-PCP visit-review of clinic visit shows "Pt has been under anesthesia before for a 9 hour back surgery and tolerated well. Pt has stress test and echo previously that was normal prior to that surgery per pt report.   Pt is not on aspirin but takes BC power for headaches.   + family Hx mother and Father CAD at 59 years old and father had bypass surgery.   Pt has appt to see GI. Due to + MUTYH for a screening " "colonoscopy. Pt is negative for BRCA. "     01/21/2020-right breast nipple sparing mastectomy and right axillary sentinel lymph node excision, left breast nipple sparing mastectomy              Pathology:  Right breast In situ and invasive duct carcinoma 0.8 cm low grade.  Margins negative for tumor.  One lymph node no tumor seen.  pT1b pN0                          Left breast shows invasive duct carcinoma 0.12 cm upper outer quadrant, margins negative.  Focal ALH.  pT1a pNx  01/21/2020-ER positive 99.5% [place] positive 99.5% HER2 negative.  Ki 67 is 6.5%, favorable  Implants were done. She had surgery that night as well for hematoma. Left breast     02/03/20203292-Ajwbnqqk-ncsymfxpei score result 11, distant recurrence risk at 9 years with AI or tamoxifen alone is 3% an average absolute chemotherapy benefit is less than 1%. (oncotype sent on initial dx right breast. Left breast incidental finding malignancy very small, Presented in tumor board, in agreement omit SLND).  02/07/2020 surgery postop visit-     2/16/20 - plastic surgery visit, red swollen breast - had already been on abx since 1/21 - her drains had been in three weeks per her report - she was then placed on 3 types of abx and was admitted to hospital and given IV abx and had surgery - cultures on 2/18 grew finegoldia magna. Replaced the implant - Right     03/23/2020-initial medical oncology consult. Start AI  5/18/20 - med onc follow-up         I have reviewed my initial consult note with detailed PMH/ROS from initial encounter and all following progress notes.   Past medical, surgical, family, and social history were reviewed today and there are no changes of note unless mentioned in HPI.     MEDS and ALLERGIES were reviewed with patient and meds reconciled.     Fatigue  Not severe but present    Weight/appetite  Good    Constitutional  Denies F/C    Pain  +   Sleep  Ok    Mucositis  Denies    Cardiovascular  Denies CP    Respiratory  Denies SOB    Nausea  " Denies    BMs  Normal    GI  Denies abdominal pain    Bleeding  Denies epistaxis, hemoptysis, BRBPR, melena, hematuria or any other bleeding    Extremities  Denies edema    Skin/nail/hair  Denies toxicity    Neuropathy  Denies    Psych  In good spirits    Misc  n/a    Exercise  Not current          Objective:      LMP 12/12/2015 (Approximate) Comment: spotting   Wt Readings from Last 30 Encounters:   02/16/20 53.4 kg (117 lb 11.6 oz)   01/21/20 53.1 kg (117 lb 1 oz)   01/13/20 53.1 kg (117 lb 1 oz)   01/02/20 53.1 kg (117 lb)   11/26/19 51.7 kg (114 lb)   11/25/19 54.4 kg (120 lb)   10/14/19 54.7 kg (120 lb 9.6 oz)   05/08/19 54 kg (119 lb 1.6 oz)   08/29/18 60.3 kg (133 lb)   07/30/18 60.3 kg (133 lb)   07/13/18 59.9 kg (132 lb)   09/11/17 59.9 kg (132 lb)   12/08/16 62.4 kg (137 lb 8 oz)   08/01/16 61.7 kg (136 lb)   12/14/15 62.7 kg (138 lb 3.2 oz)       General: Well-developed/ well nourished; comfortable; A&O x 3  HEENT: Normocephalic, atraumatic, anicteric sclera, no nasal discharge    NECK: no visible goiter  Respiratory: respirations are visibly unlabored; no audible wheezing  Cariovascular: no edema  Abdominal: non-distended  Musculoskeletal: visible Joints not swollen  Skin: no visible rashes or lesions  Neurological: grossly intact         CBC reviewed, noted are Hg improved, ferritin 4ng/mL.iron sat 16%.  Assessment:       1. Malignant neoplasm of upper-outer quadrant of right breast in female, estrogen receptor positive    2. Aromatase inhibitor-associated arthralgia    3. Fatigue, unspecified type    4. Malignant neoplasm of left breast in female, estrogen receptor positive, unspecified site of breast    5. Osteoarthritis, unspecified osteoarthritis type, unspecified site    6. Bilateral carpal tunnel syndrome    7. Iron deficiency      Cancer Staging  Stage IA left breast  Stage IA right breast     ECOG 2     Patient is a 59 y.o. year old female with new diagnosis bilateral breast cancer after right  breast mass felt on SBE and identified on MMG/biopsy, now s/p Right mastectomy, and left breast cancer identified incidentally at time of prophylactic left mastectomy.       Discussed diagnosis, staging, receptor status, various treatments including surgery, chemotherapy, radiation, endocrine therapy. Discussed recommended treatment plan and benefits/risks, rationale for recommendations based on NCCN guidelines. Patient and family expressed understanding and questions answered to their satisfaction.     For this patient, low oncotype result on 8mm IDC of right breast which is good news.      Discussed pros/cons adjvuant AI minimum 5 years and re-eval, consider 10 years at that time and rationale.   Discussed side effects, benefit/risk of AI. Concern with her baseline chronic joint aches and pains, she expressed good understanding of possible SE of AI and we will re-eval closely for arthrlagia  Discussed bone health and Vit D/calcium.      Discussed benefits of exercise and healthy body weight (BMI<25) in decreasing risk of breast cancer recurrence and advised exercise and diet, she expressed understanding.        Also reviewed her labs, thrombocytosis, anemia after surgery in detail and evaluation recommended, labs.      Discussed coping with cancer diagnosis and support given.      Discussed exercise, nutrition.      05/18/2020, patient is having intolerance to aromatase inhibitor affecting quality of life and ADLs, discussed in great detail in all other medication options and rationale and as it pertains to her personal risk of recurrence which is overall low but present.  Plan to stop current AI followed by start exercise and rechallenge with aromasin in 3-4 weeks. She also hasn't obtained lab work we discussed due to pandemic, to eval her thrombocytosis/anemia, she will contact us when leaving home and feels comfortable doing so.     7/7/20 - clinically remains HANANE, discussed intolerance to AI, new dx carpal  tunnel, multiple joints/back issues OA severe.   We discussed stop AI and replace iron and re-eval tolerance.   Discussed had GI SE with oral iron, recommend IV, she is in agreement  Long discussion about all of above, anxiety, coping, support given.        Discussed COVID-19 and social distancing in great detail, avoid all non-essential visits out of the home if possible and avoid sick contacts.       Plan:       Diagnoses and all orders for this visit:    Malignant neoplasm of upper-outer quadrant of right breast in female, estrogen receptor positive    Aromatase inhibitor-associated arthralgia    Fatigue, unspecified type    Malignant neoplasm of left breast in female, estrogen receptor positive, unspecified site of breast    Osteoarthritis, unspecified osteoarthritis type, unspecified site    Bilateral carpal tunnel syndrome    Iron deficiency    Other orders  -     ferumoxytoL (FERAHEME) 510 mg in dextrose 5 % 100 mL IVPB  -     heparin, porcine (PF) 100 unit/mL injection flush 500 Units  -     sodium chloride 0.9% flush 10 mL  -     sodium chloride 0.9% 100 mL flush bag            Iron infusion X 2, please schedule    MD vis in 4 weeks to follow-up and consider restart AI. Labs at that time - cbc, iron profile, ferritin.     Exercise/nutrition important, reviewed    Continue current medications, reviewed.      Important to keep follow-up with PCP and surgery.     Discussed plan above in great detail with patient and all questions answered to their satisfaction. Proceed with plan above.               Brandi Bolaños M.D.  Hematology Oncology  [facility]

## 2020-07-24 ENCOUNTER — INFUSION (OUTPATIENT)
Dept: INFUSION THERAPY | Facility: HOSPITAL | Age: 60
End: 2020-07-24
Attending: INTERNAL MEDICINE
Payer: COMMERCIAL

## 2020-07-24 VITALS
TEMPERATURE: 99 F | BODY MASS INDEX: 24.76 KG/M2 | DIASTOLIC BLOOD PRESSURE: 67 MMHG | RESPIRATION RATE: 18 BRPM | HEART RATE: 72 BPM | HEIGHT: 60 IN | SYSTOLIC BLOOD PRESSURE: 99 MMHG | WEIGHT: 126.13 LBS

## 2020-07-24 DIAGNOSIS — E61.1 IRON DEFICIENCY: Primary | ICD-10-CM

## 2020-07-24 PROCEDURE — 63600175 PHARM REV CODE 636 W HCPCS: Mod: JG,PN | Performed by: INTERNAL MEDICINE

## 2020-07-24 PROCEDURE — 25000003 PHARM REV CODE 250: Mod: PN | Performed by: INTERNAL MEDICINE

## 2020-07-24 PROCEDURE — 96365 THER/PROPH/DIAG IV INF INIT: CPT | Mod: PN

## 2020-07-24 RX ORDER — SODIUM CHLORIDE 0.9 % (FLUSH) 0.9 %
10 SYRINGE (ML) INJECTION
Status: DISCONTINUED | OUTPATIENT
Start: 2020-07-24 | End: 2020-07-24 | Stop reason: HOSPADM

## 2020-07-24 RX ORDER — HEPARIN 100 UNIT/ML
500 SYRINGE INTRAVENOUS
Status: CANCELLED | OUTPATIENT
Start: 2020-07-24

## 2020-07-24 RX ORDER — SODIUM CHLORIDE 0.9 % (FLUSH) 0.9 %
10 SYRINGE (ML) INJECTION
Status: CANCELLED | OUTPATIENT
Start: 2020-07-24

## 2020-07-24 RX ADMIN — SODIUM CHLORIDE: 9 INJECTION, SOLUTION INTRAVENOUS at 10:07

## 2020-07-24 RX ADMIN — FERUMOXYTOL 510 MG: 510 INJECTION INTRAVENOUS at 10:07

## 2020-07-24 NOTE — PLAN OF CARE
Problem: Adult Inpatient Plan of Care  Goal: Plan of Care Review  Outcome: Ongoing, Progressing     Pt tolerated feraheme this shift. VSS post 30 min observation. Calendar given and reviewed with pt. Pt is safe for discharge.

## 2020-07-27 ENCOUNTER — INFUSION (OUTPATIENT)
Dept: INFUSION THERAPY | Facility: HOSPITAL | Age: 60
End: 2020-07-27
Attending: INTERNAL MEDICINE
Payer: COMMERCIAL

## 2020-07-27 VITALS
HEIGHT: 60 IN | BODY MASS INDEX: 24.76 KG/M2 | RESPIRATION RATE: 18 BRPM | TEMPERATURE: 99 F | SYSTOLIC BLOOD PRESSURE: 98 MMHG | DIASTOLIC BLOOD PRESSURE: 63 MMHG | WEIGHT: 126.13 LBS | HEART RATE: 77 BPM

## 2020-07-27 DIAGNOSIS — E61.1 IRON DEFICIENCY: Primary | ICD-10-CM

## 2020-07-27 PROCEDURE — 63600175 PHARM REV CODE 636 W HCPCS: Mod: JG,PN | Performed by: INTERNAL MEDICINE

## 2020-07-27 PROCEDURE — A4216 STERILE WATER/SALINE, 10 ML: HCPCS | Mod: PN | Performed by: INTERNAL MEDICINE

## 2020-07-27 PROCEDURE — 25000003 PHARM REV CODE 250: Mod: PN | Performed by: INTERNAL MEDICINE

## 2020-07-27 PROCEDURE — 96365 THER/PROPH/DIAG IV INF INIT: CPT | Mod: PN

## 2020-07-27 RX ORDER — SODIUM CHLORIDE 0.9 % (FLUSH) 0.9 %
10 SYRINGE (ML) INJECTION
Status: DISCONTINUED | OUTPATIENT
Start: 2020-07-27 | End: 2020-07-27 | Stop reason: HOSPADM

## 2020-07-27 RX ORDER — HEPARIN 100 UNIT/ML
500 SYRINGE INTRAVENOUS
Status: CANCELLED | OUTPATIENT
Start: 2020-07-27

## 2020-07-27 RX ORDER — SODIUM CHLORIDE 0.9 % (FLUSH) 0.9 %
10 SYRINGE (ML) INJECTION
Status: CANCELLED | OUTPATIENT
Start: 2020-07-27

## 2020-07-27 RX ADMIN — SODIUM CHLORIDE: 9 INJECTION, SOLUTION INTRAVENOUS at 10:07

## 2020-07-27 RX ADMIN — Medication 10 ML: at 10:07

## 2020-07-27 RX ADMIN — FERUMOXYTOL 510 MG: 510 INJECTION INTRAVENOUS at 10:07

## 2020-08-04 ENCOUNTER — OFFICE VISIT (OUTPATIENT)
Dept: HEMATOLOGY/ONCOLOGY | Facility: CLINIC | Age: 60
End: 2020-08-04
Payer: COMMERCIAL

## 2020-08-04 ENCOUNTER — LAB VISIT (OUTPATIENT)
Dept: LAB | Facility: HOSPITAL | Age: 60
End: 2020-08-04
Attending: INTERNAL MEDICINE
Payer: COMMERCIAL

## 2020-08-04 VITALS
OXYGEN SATURATION: 100 % | BODY MASS INDEX: 23.67 KG/M2 | WEIGHT: 120.56 LBS | RESPIRATION RATE: 12 BRPM | TEMPERATURE: 98 F | SYSTOLIC BLOOD PRESSURE: 117 MMHG | HEIGHT: 60 IN | HEART RATE: 80 BPM | DIASTOLIC BLOOD PRESSURE: 92 MMHG

## 2020-08-04 DIAGNOSIS — Z17.0 MALIGNANT NEOPLASM OF UPPER-OUTER QUADRANT OF RIGHT BREAST IN FEMALE, ESTROGEN RECEPTOR POSITIVE: ICD-10-CM

## 2020-08-04 DIAGNOSIS — Z17.0 MALIGNANT NEOPLASM OF UPPER-OUTER QUADRANT OF RIGHT BREAST IN FEMALE, ESTROGEN RECEPTOR POSITIVE: Primary | ICD-10-CM

## 2020-08-04 DIAGNOSIS — C50.411 MALIGNANT NEOPLASM OF UPPER-OUTER QUADRANT OF RIGHT BREAST IN FEMALE, ESTROGEN RECEPTOR POSITIVE: Primary | ICD-10-CM

## 2020-08-04 DIAGNOSIS — M54.50 CHRONIC LOW BACK PAIN WITHOUT SCIATICA, UNSPECIFIED BACK PAIN LATERALITY: ICD-10-CM

## 2020-08-04 DIAGNOSIS — G56.03 BILATERAL CARPAL TUNNEL SYNDROME: ICD-10-CM

## 2020-08-04 DIAGNOSIS — T45.1X5A AROMATASE INHIBITOR-ASSOCIATED ARTHRALGIA: ICD-10-CM

## 2020-08-04 DIAGNOSIS — G89.29 CHRONIC LOW BACK PAIN WITHOUT SCIATICA, UNSPECIFIED BACK PAIN LATERALITY: ICD-10-CM

## 2020-08-04 DIAGNOSIS — R53.83 FATIGUE, UNSPECIFIED TYPE: ICD-10-CM

## 2020-08-04 DIAGNOSIS — C50.411 MALIGNANT NEOPLASM OF UPPER-OUTER QUADRANT OF RIGHT BREAST IN FEMALE, ESTROGEN RECEPTOR POSITIVE: ICD-10-CM

## 2020-08-04 DIAGNOSIS — M19.90 OSTEOARTHRITIS, UNSPECIFIED OSTEOARTHRITIS TYPE, UNSPECIFIED SITE: ICD-10-CM

## 2020-08-04 DIAGNOSIS — M25.50 AROMATASE INHIBITOR-ASSOCIATED ARTHRALGIA: ICD-10-CM

## 2020-08-04 LAB
BASOPHILS # BLD AUTO: 0.03 K/UL (ref 0–0.2)
BASOPHILS NFR BLD: 0.3 % (ref 0–1.9)
DIFFERENTIAL METHOD: ABNORMAL
EOSINOPHIL # BLD AUTO: 0.1 K/UL (ref 0–0.5)
EOSINOPHIL NFR BLD: 0.6 % (ref 0–8)
ERYTHROCYTE [DISTWIDTH] IN BLOOD BY AUTOMATED COUNT: 14.3 % (ref 11.5–14.5)
FERRITIN SERPL-MCNC: 227 NG/ML (ref 12–264)
HCT VFR BLD AUTO: 42.2 % (ref 37–48.5)
HGB BLD-MCNC: 13.1 G/DL (ref 12–16)
IMM GRANULOCYTES # BLD AUTO: 0.02 K/UL (ref 0–0.04)
IMM GRANULOCYTES NFR BLD AUTO: 0.2 % (ref 0–0.5)
IRON SATN MFR SERPL: >100 % (ref 20–50)
IRON SERPL-MCNC: 340 UG/DL (ref 30–160)
LYMPHOCYTES # BLD AUTO: 2.3 K/UL (ref 1–4.8)
LYMPHOCYTES NFR BLD: 26.7 % (ref 18–48)
MCH RBC QN AUTO: 26.6 PG (ref 27–31)
MCHC RBC AUTO-ENTMCNC: 31 G/DL (ref 32–36)
MCV RBC AUTO: 86 FL (ref 82–98)
MONOCYTES # BLD AUTO: 0.7 K/UL (ref 0.3–1)
MONOCYTES NFR BLD: 7.8 % (ref 4–15)
NEUTROPHILS # BLD AUTO: 5.6 K/UL (ref 1.8–7.7)
NEUTROPHILS NFR BLD: 64.4 % (ref 38–73)
NRBC BLD-RTO: 0 /100 WBC
PLATELET # BLD AUTO: 499 K/UL (ref 150–350)
PMV BLD AUTO: 9.7 FL (ref 9.2–12.9)
RBC # BLD AUTO: 4.92 M/UL (ref 4–5.4)
TOTAL IRON BINDING CAPACITY: 305 UG/DL (ref 265–497)
WBC # BLD AUTO: 8.73 K/UL (ref 3.9–12.7)

## 2020-08-04 PROCEDURE — 83540 ASSAY OF IRON: CPT | Mod: PN

## 2020-08-04 PROCEDURE — 3080F PR MOST RECENT DIASTOLIC BLOOD PRESSURE >= 90 MM HG: ICD-10-PCS | Mod: CPTII,S$GLB,, | Performed by: INTERNAL MEDICINE

## 2020-08-04 PROCEDURE — 82728 ASSAY OF FERRITIN: CPT | Mod: PN

## 2020-08-04 PROCEDURE — 36415 COLL VENOUS BLD VENIPUNCTURE: CPT | Mod: PN

## 2020-08-04 PROCEDURE — 3008F PR BODY MASS INDEX (BMI) DOCUMENTED: ICD-10-PCS | Mod: CPTII,S$GLB,, | Performed by: INTERNAL MEDICINE

## 2020-08-04 PROCEDURE — 99999 PR PBB SHADOW E&M-EST. PATIENT-LVL IV: CPT | Mod: PBBFAC,,, | Performed by: INTERNAL MEDICINE

## 2020-08-04 PROCEDURE — 3080F DIAST BP >= 90 MM HG: CPT | Mod: CPTII,S$GLB,, | Performed by: INTERNAL MEDICINE

## 2020-08-04 PROCEDURE — 85025 COMPLETE CBC W/AUTO DIFF WBC: CPT

## 2020-08-04 PROCEDURE — 99214 OFFICE O/P EST MOD 30 MIN: CPT | Mod: S$GLB,,, | Performed by: INTERNAL MEDICINE

## 2020-08-04 PROCEDURE — 3074F PR MOST RECENT SYSTOLIC BLOOD PRESSURE < 130 MM HG: ICD-10-PCS | Mod: CPTII,S$GLB,, | Performed by: INTERNAL MEDICINE

## 2020-08-04 PROCEDURE — 83540 ASSAY OF IRON: CPT

## 2020-08-04 PROCEDURE — 99999 PR PBB SHADOW E&M-EST. PATIENT-LVL IV: ICD-10-PCS | Mod: PBBFAC,,, | Performed by: INTERNAL MEDICINE

## 2020-08-04 PROCEDURE — 82728 ASSAY OF FERRITIN: CPT

## 2020-08-04 PROCEDURE — 3074F SYST BP LT 130 MM HG: CPT | Mod: CPTII,S$GLB,, | Performed by: INTERNAL MEDICINE

## 2020-08-04 PROCEDURE — 3008F BODY MASS INDEX DOCD: CPT | Mod: CPTII,S$GLB,, | Performed by: INTERNAL MEDICINE

## 2020-08-04 PROCEDURE — 85025 COMPLETE CBC W/AUTO DIFF WBC: CPT | Mod: PN

## 2020-08-04 PROCEDURE — 99214 PR OFFICE/OUTPT VISIT, EST, LEVL IV, 30-39 MIN: ICD-10-PCS | Mod: S$GLB,,, | Performed by: INTERNAL MEDICINE

## 2020-08-04 RX ORDER — LORAZEPAM 0.5 MG/1
1 TABLET ORAL
COMMUNITY
End: 2020-11-24

## 2020-08-04 RX ORDER — TAMOXIFEN CITRATE 20 MG/1
20 TABLET ORAL DAILY
Qty: 30 TABLET | Refills: 11 | Status: SHIPPED | OUTPATIENT
Start: 2020-08-04 | End: 2021-08-16

## 2020-08-04 RX ORDER — FERROUS SULFATE 325(65) MG
325 TABLET ORAL
COMMUNITY
End: 2021-06-28

## 2020-08-04 RX ORDER — ESCITALOPRAM OXALATE 20 MG/1
1 TABLET ORAL
COMMUNITY
End: 2020-08-04

## 2020-08-04 RX ORDER — HYDROMORPHONE HYDROCHLORIDE 4 MG/1
TABLET ORAL
COMMUNITY
Start: 2020-07-14 | End: 2024-03-29

## 2020-08-04 NOTE — PROGRESS NOTES
"FOLLOW-UP VISIT    Patient name: Ricardo Perera  Date: 8/4/20      Subjective:       Patient ID: Ricardo Perera is a 59 y.o. female.    Chief Complaint: No chief complaint on file.    HPI     Here after stopped AI severe side effects.   Did have iron infusion and feeling somewhat better    Multiple issues with joints continue  Back surgery recommended  Carpal tunnel bilateral also symptomatic.     Dr. Sammy stevens mgmt changed her to dilaudid due to pain so severe.     Discussed all this in great detail and failed AI trials, expect will continue high risk for notable SE.   She has some depression as would expect related to all of this, care for her daughter paralyzed, pandemic restrictions, fear of cancer recurrence.     1/2021 daughter expecting baby.    See detailed oncology history below, updated with most recent scans, labs, pertinent medical history.   Oncology History   Invasive ductal carcinoma of breast, female, right   1/2/2020 Initial Diagnosis    Invasive ductal carcinoma of breast, female, right    Oncology history for review:   11/25/2019 mammogram right breast shows 15 mm equal density irregularly-shaped mass with indistinct margins.  Right breast ultrasound shows 15 x 9 x 11 mm irregular shaped hypoechoic mass with angular margins.     11/26/2019-right breast core needle biopsy              Pathology:  Invasive duct carcinoma intermediate grade.              estrogen receptor 99.5% positive, LA in 98.8% positive, Ki 67 low.  HER2 negative      12/23/2019-[facility] diagnostic testing results-MUTYH heterozygous gene positive              (one daughter positive for this gene age 28 and 34 negative).   12/27/2019-MRI breast bilateral-right breast shows 1.4 x 1.0 x 1.1 cm mass at posterior 9:00 a.m..  Left breast shows no evidence of malignancy  01/02/2020-PCP visit-review of clinic visit shows "Pt has been under anesthesia before for a 9 hour back surgery and tolerated well. Pt has stress test and echo " "previously that was normal prior to that surgery per pt report.   Pt is not on aspirin but takes BC power for headaches.   + family Hx mother and Father CAD at 59 years old and father had bypass surgery.   Pt has appt to see GI. Due to + MUTYH for a screening colonoscopy. Pt is negative for BRCA. "     01/21/2020-right breast nipple sparing mastectomy and right axillary sentinel lymph node excision, left breast nipple sparing mastectomy              Pathology:  Right breast In situ and invasive duct carcinoma 0.8 cm low grade.  Margins negative for tumor.  One lymph node no tumor seen.  pT1b pN0                          Left breast shows invasive duct carcinoma 0.12 cm upper outer quadrant, margins negative.  Focal ALH.  pT1a pNx  01/21/2020-ER positive 99.5% IN positive 99.5% HER2 negative.  Ki 67 is 6.5%, favorable  Implants were done. She had surgery that night as well for hematoma. Left breast     02/03/20202157-Irzjpvxh-qotfxsuxju score result 11, distant recurrence risk at 9 years with AI or tamoxifen alone is 3% an average absolute chemotherapy benefit is less than 1%. (oncotype sent on initial dx right breast. Left breast incidental finding malignancy very small, Presented in tumor board, in agreement omit SLND).  02/07/2020 surgery postop visit-     2/16/20 - plastic surgery visit, red swollen breast - had already been on abx since 1/21 - her drains had been in three weeks per her report - she was then placed on 3 types of abx and was admitted to hospital and given IV abx and had surgery - cultures on 2/18 grew finegoldia magna. Replaced the implant - Right     03/23/2020-initial medical oncology consult. Start AI  5/18/20 - med onc follow-up - very poor tolerance stopped arimidex. Plan to start aromasin  7/2020 - poor tolerance aromasin.   8/4/20 - plan to start tamoxifen         I have reviewed my initial consult note with detailed PMH/ROS from initial encounter and all following progress notes.   Past " medical, surgical, family, and social history were reviewed today and there are no changes of note unless mentioned in HPI.     MEDS and ALLERGIES were reviewed with patient and meds reconciled.     Fatigue  yes    Weight/appetite  Good    Constitutional  Denies F/C    Pain  Notable diffuse   Sleep  Ok    Mucositis  Denies    Cardiovascular  Denies CP    Respiratory  Denies SOB    Nausea  Denies    BMs  Normal    GI  Denies abdominal pain    Bleeding  Denies epistaxis, hemoptysis, BRBPR, melena, hematuria or any other bleeding    Extremities  Denies edema    Skin/nail/hair  Denies toxicity    Neuropathy  Denies    Psych  depressed   Misc  n/a    Exercise  none          Objective:      BP (!) 117/92 (BP Location: Left arm, Patient Position: Sitting)   Pulse 80   Temp 97.8 °F (36.6 °C) (Temporal)   Resp 12   Ht 5' (1.524 m)   Wt 54.7 kg (120 lb 9.5 oz)   LMP 12/12/2015 (Approximate) Comment: spotting   SpO2 100%   BMI 23.55 kg/m²   Wt Readings from Last 30 Encounters:   08/04/20 54.7 kg (120 lb 9.5 oz)   07/27/20 57.2 kg (126 lb 1.7 oz)   07/24/20 57.2 kg (126 lb 1.7 oz)   02/16/20 53.4 kg (117 lb 11.6 oz)   01/21/20 53.1 kg (117 lb 1 oz)   01/13/20 53.1 kg (117 lb 1 oz)   01/02/20 53.1 kg (117 lb)   11/26/19 51.7 kg (114 lb)   11/25/19 54.4 kg (120 lb)   10/14/19 54.7 kg (120 lb 9.6 oz)   05/08/19 54 kg (119 lb 1.6 oz)   08/29/18 60.3 kg (133 lb)   07/30/18 60.3 kg (133 lb)   07/13/18 59.9 kg (132 lb)   09/11/17 59.9 kg (132 lb)   12/08/16 62.4 kg (137 lb 8 oz)   08/01/16 61.7 kg (136 lb)   12/14/15 62.7 kg (138 lb 3.2 oz)       Constitutional: Patient is oriented to person, place, and time. No distress. Well appaering  HENT: Mouth/Throat: Oropharynx is clear and moist. No oropharyngeal exudate.   Eyes: Conjunctivae and EOM are normal.   Neck: Normal range of motion. Neck supple.   Cardiovascular: Normal rate, regular rhythm, normal heart sounds and intact distal pulses.    Pulmonary/Chest: Effort normal and  breath sounds normal. no wheezes. no rales.    Abdominal: Soft. Bowel sounds are normal. Patient exhibits no distension. There is no tenderness.   Musculoskeletal: patient exhibits no edema or tenderness.   Lymphadenopathy:  patient  has no cervical adenopathy. no supraclavicular adenopathy. No axillary LAD.   Neurological: Patient is alert and oriented to person, place, and time. normal strength. No cranial nerve deficit or sensory deficit.   Skin: Skin is warm and dry. No rash noted. No erythema.   Psychiatric: Patient has a normal mood and affect. speech is normal.   Nursing note and vitals reviewed.        Assessment:       1. Malignant neoplasm of upper-outer quadrant of right breast in female, estrogen receptor positive    2. Aromatase inhibitor-associated arthralgia    3. Fatigue, unspecified type    4. Osteoarthritis, unspecified osteoarthritis type, unspecified site    5. Bilateral carpal tunnel syndrome    6. Chronic low back pain without sciatica, unspecified back pain laterality      Stage IA left breast  Stage IA right breast     ECOG 2     Patient is a 59 y.o. year old female with new diagnosis bilateral breast cancer after right breast mass felt on SBE and identified on MMG/biopsy, now s/p Right mastectomy, and left breast cancer identified incidentally at time of prophylactic left mastectomy.       Discussed diagnosis, staging, receptor status, various treatments including surgery, chemotherapy, radiation, endocrine therapy. Discussed recommended treatment plan and benefits/risks, rationale for recommendations based on NCCN guidelines. Patient and family expressed understanding and questions answered to their satisfaction.     For this patient, low oncotype result on 8mm IDC of right breast which is good news.      Discussed pros/cons adjvuant AI minimum 5 years and re-eval, consider 10 years at that time and rationale.   Discussed side effects, benefit/risk of AI. Concern with her baseline chronic  joint aches and pains, she expressed good understanding of possible SE of AI and we will re-eval closely for arthrlagia  Discussed bone health and Vit D/calcium.      Discussed benefits of exercise and healthy body weight (BMI<25) in decreasing risk of breast cancer recurrence and advised exercise and diet, she expressed understanding.        Also reviewed her labs, thrombocytosis, anemia after surgery in detail and evaluation recommended, labs.      Discussed coping with cancer diagnosis and support given.      Discussed exercise, nutrition.      05/18/2020, patient is having intolerance to aromatase inhibitor affecting quality of life and ADLs, discussed in great detail in all other medication options and rationale and as it pertains to her personal risk of recurrence which is overall low but present.  Plan to stop current AI followed by start exercise and rechallenge with aromasin in 3-4 weeks. She also hasn't obtained lab work we discussed due to pandemic, to eval her thrombocytosis/anemia, she will contact us when leaving home and feels comfortable doing so.      7/7/20 - clinically remains HANANE, discussed intolerance to AI, new dx carpal tunnel, multiple joints/back issues OA severe.   We discussed stop AI and replace iron and re-eval tolerance.   Discussed had GI SE with oral iron, recommend IV, she is in agreement  Long discussion about all of above, anxiety, coping, support given.     8/4/20 - reviewed again multiple pain arthrlagia joint back issues on second AI. Discussed in detail.  Discussed start tamoxifen, she is in agreement. Advised on SE/benefit/risk in detail.         Discussed COVID-19 and social distancing in great detail, avoid all non-essential visits out of the home if possible and avoid sick contacts.       Plan:       Diagnoses and all orders for this visit:    Malignant neoplasm of upper-outer quadrant of right breast in female, estrogen receptor positive  -     tamoxifen (NOLVADEX) 20 MG  Tab; Take 1 tablet (20 mg total) by mouth once daily.    Aromatase inhibitor-associated arthralgia    Fatigue, unspecified type    Osteoarthritis, unspecified osteoarthritis type, unspecified site    Bilateral carpal tunnel syndrome    Chronic low back pain without sciatica, unspecified back pain laterality            MD vis 4-6 weeks from now in person or telemed okay to review start of tamoxifen.     Exercise/nutrition important, reviewed    Continue current medications, reviewed.      Important to keep follow-up with PCP and pain mgmt and ortho.     Discussed plan above in great detail with patient and all questions answered to their satisfaction. Proceed with plan above.          Brandi Bolaños M.D.  Hematology Oncology  [facility]

## 2020-09-04 PROBLEM — C50.912 INFILTRATING DUCTAL CARCINOMA OF BREAST, LEFT: Status: ACTIVE | Noted: 2020-09-04

## 2020-09-23 ENCOUNTER — PATIENT MESSAGE (OUTPATIENT)
Dept: HEMATOLOGY/ONCOLOGY | Facility: CLINIC | Age: 60
End: 2020-09-23

## 2020-09-25 ENCOUNTER — PATIENT MESSAGE (OUTPATIENT)
Dept: HEMATOLOGY/ONCOLOGY | Facility: CLINIC | Age: 60
End: 2020-09-25

## 2020-09-29 PROBLEM — D05.12 DUCTAL CARCINOMA IN SITU (DCIS) OF LEFT BREAST: Status: ACTIVE | Noted: 2020-09-29

## 2020-09-29 PROBLEM — R59.0 AXILLARY LYMPHADENOPATHY: Status: ACTIVE | Noted: 2020-09-29

## 2020-09-29 PROBLEM — C50.911 INFILTRATING DUCTAL CARCINOMA OF BREAST, RIGHT: Status: ACTIVE | Noted: 2020-09-29

## 2020-10-07 ENCOUNTER — OFFICE VISIT (OUTPATIENT)
Dept: HEMATOLOGY/ONCOLOGY | Facility: CLINIC | Age: 60
End: 2020-10-07
Payer: COMMERCIAL

## 2020-10-07 ENCOUNTER — PATIENT MESSAGE (OUTPATIENT)
Dept: HEMATOLOGY/ONCOLOGY | Facility: CLINIC | Age: 60
End: 2020-10-07

## 2020-10-07 VITALS
TEMPERATURE: 98 F | SYSTOLIC BLOOD PRESSURE: 138 MMHG | DIASTOLIC BLOOD PRESSURE: 92 MMHG | BODY MASS INDEX: 24.15 KG/M2 | RESPIRATION RATE: 18 BRPM | WEIGHT: 123 LBS | HEART RATE: 94 BPM | OXYGEN SATURATION: 98 % | HEIGHT: 60 IN

## 2020-10-07 DIAGNOSIS — Z17.0 MALIGNANT NEOPLASM OF UPPER-OUTER QUADRANT OF RIGHT BREAST IN FEMALE, ESTROGEN RECEPTOR POSITIVE: Primary | ICD-10-CM

## 2020-10-07 DIAGNOSIS — R59.9 ENLARGED LYMPH NODE: ICD-10-CM

## 2020-10-07 DIAGNOSIS — Z79.810 USE OF TAMOXIFEN (NOLVADEX): ICD-10-CM

## 2020-10-07 DIAGNOSIS — C50.411 MALIGNANT NEOPLASM OF UPPER-OUTER QUADRANT OF RIGHT BREAST IN FEMALE, ESTROGEN RECEPTOR POSITIVE: Primary | ICD-10-CM

## 2020-10-07 DIAGNOSIS — R20.0 NUMBNESS AND TINGLING: ICD-10-CM

## 2020-10-07 DIAGNOSIS — R20.2 NUMBNESS AND TINGLING: ICD-10-CM

## 2020-10-07 PROCEDURE — 99999 PR PBB SHADOW E&M-EST. PATIENT-LVL IV: ICD-10-PCS | Mod: PBBFAC,,, | Performed by: INTERNAL MEDICINE

## 2020-10-07 PROCEDURE — 99999 PR PBB SHADOW E&M-EST. PATIENT-LVL IV: CPT | Mod: PBBFAC,,, | Performed by: INTERNAL MEDICINE

## 2020-10-07 PROCEDURE — 3008F PR BODY MASS INDEX (BMI) DOCUMENTED: ICD-10-PCS | Mod: CPTII,S$GLB,, | Performed by: INTERNAL MEDICINE

## 2020-10-07 PROCEDURE — 3075F PR MOST RECENT SYSTOLIC BLOOD PRESS GE 130-139MM HG: ICD-10-PCS | Mod: CPTII,S$GLB,, | Performed by: INTERNAL MEDICINE

## 2020-10-07 PROCEDURE — 99214 PR OFFICE/OUTPT VISIT, EST, LEVL IV, 30-39 MIN: ICD-10-PCS | Mod: S$GLB,,, | Performed by: INTERNAL MEDICINE

## 2020-10-07 PROCEDURE — 3075F SYST BP GE 130 - 139MM HG: CPT | Mod: CPTII,S$GLB,, | Performed by: INTERNAL MEDICINE

## 2020-10-07 PROCEDURE — 3008F BODY MASS INDEX DOCD: CPT | Mod: CPTII,S$GLB,, | Performed by: INTERNAL MEDICINE

## 2020-10-07 PROCEDURE — 3080F DIAST BP >= 90 MM HG: CPT | Mod: CPTII,S$GLB,, | Performed by: INTERNAL MEDICINE

## 2020-10-07 PROCEDURE — 99214 OFFICE O/P EST MOD 30 MIN: CPT | Mod: S$GLB,,, | Performed by: INTERNAL MEDICINE

## 2020-10-07 PROCEDURE — 3080F PR MOST RECENT DIASTOLIC BLOOD PRESSURE >= 90 MM HG: ICD-10-PCS | Mod: CPTII,S$GLB,, | Performed by: INTERNAL MEDICINE

## 2020-10-07 NOTE — PROGRESS NOTES
FOLLOW-UP VISIT    Patient name: Ricardo Perera  Date: 10/7/20      Subjective:       Patient ID: Ricardo Perera is a 59 y.o. female.    Chief Complaint: Follow-up    HPI   has started tamoxifen after poor tolerance AI likely related to multiple underlying joint issues.   Continues multiple severe joint issues prior to this.  Remains with severe numb tingling hands but overall dont feel tamoxifen contributing.     MRI c/t/l is pending  EMG to be done    Hot flashes but not severe, she feels tolerable    Recent biopsy pending LN axilla. Done friday    To review:     Here after stopped AI severe side effects.   Did have iron infusion and feeling somewhat better    Multiple issues with joints continue  Back surgery recommended  Carpal tunnel bilateral also symptomatic.     Dr. Sammy stevens mgmt changed her to dilaudid due to pain so severe.     Discussed all this in great detail and failed AI trials, expect will continue high risk for notable SE.   She has some depression as would expect related to all of this, care for her daughter paralyzed, pandemic restrictions, fear of cancer recurrence.     1/2021 daughter expecting baby.    See detailed oncology history below, updated with most recent scans, labs, pertinent medical history.   Oncology History   Invasive ductal carcinoma of breast, female, right   1/2/2020 Initial Diagnosis    Invasive ductal carcinoma of breast, female, right    Oncology history for review:   11/25/2019 mammogram right breast shows 15 mm equal density irregularly-shaped mass with indistinct margins.  Right breast ultrasound shows 15 x 9 x 11 mm irregular shaped hypoechoic mass with angular margins.     11/26/2019-right breast core needle biopsy              Pathology:  Invasive duct carcinoma intermediate grade.              estrogen receptor 99.5% positive, [place] in 98.8% positive, Ki 67 low.  HER2 negative      12/23/2019-[facility] diagnostic testing results-MUTYH heterozygous gene positive              " (one daughter positive for this gene age 28 and 34 negative).   12/27/2019-MRI breast bilateral-right breast shows 1.4 x 1.0 x 1.1 cm mass at posterior 9:00 a.m..  Left breast shows no evidence of malignancy  01/02/2020-PCP visit-review of clinic visit shows "Pt has been under anesthesia before for a 9 hour back surgery and tolerated well. Pt has stress test and echo previously that was normal prior to that surgery per pt report.   Pt is not on aspirin but takes BC power for headaches.   + family Hx mother and Father CAD at 59 years old and father had bypass surgery.   Pt has appt to see GI. Due to + MUTYH for a screening colonoscopy. Pt is negative for BRCA. "     01/21/2020-right breast nipple sparing mastectomy and right axillary sentinel lymph node excision, left breast nipple sparing mastectomy              Pathology:  Right breast In situ and invasive duct carcinoma 0.8 cm low grade.  Margins negative for tumor.  One lymph node no tumor seen.  pT1b pN0                          Left breast shows invasive duct carcinoma 0.12 cm upper outer quadrant, margins negative.  Focal ALH.  pT1a pNx  01/21/2020-ER positive 99.5% PA positive 99.5% HER2 negative.  Ki 67 is 6.5%, favorable  Implants were done. She had surgery that night as well for hematoma. Left breast     02/03/20203730-Umeuzjew-rxbrbmbufs score result 11, distant recurrence risk at 9 years with AI or tamoxifen alone is 3% an average absolute chemotherapy benefit is less than 1%. (oncotype sent on initial dx right breast. Left breast incidental finding malignancy very small, Presented in tumor board, in agreement omit SLND).  02/07/2020 surgery postop visit-     2/16/20 - plastic surgery visit, red swollen breast - had already been on abx since 1/21 - her drains had been in three weeks per her report - she was then placed on 3 types of abx and was admitted to hospital and given IV abx and had surgery - cultures on 2/18 grew finegoldia magna. Replaced the " implant - Right     03/23/2020-initial medical oncology consult. Start AI  5/18/20 - med onc follow-up - very poor tolerance stopped arimidex. Plan to start aromasin  7/2020 - poor tolerance aromasin.   8/4/20 - plan to start tamoxifen     9/4/2020 Cancer Staged    Staging form: Breast, AJCC 8th Edition  - Clinical: Stage IA (cT1c, cN0, cM0, G2, ER+, SD+, HER2-)     9/4/2020 Cancer Staged    Staging form: Breast, AJCC 8th Edition  - Pathologic: Stage IA (pT1b, pN0(sn), cM0, G2, ER+, SD+, HER2-, Oncotype DX score: 11)     Infiltrating ductal carcinoma of breast, left   9/4/2020 Initial Diagnosis    Infiltrating ductal carcinoma of breast, left     9/4/2020 Cancer Staged    Staging form: Breast, AJCC 8th Edition  - Pathologic: Stage Unknown (pT1a, pNX, cM0, G2, ER+, SD+)         I have reviewed my initial consult note with detailed PMH/ROS from initial encounter and all following progress notes.   Past medical, surgical, family, and social history were reviewed today and there are no changes of note unless mentioned in HPI.     MEDS and ALLERGIES were reviewed with patient and meds reconciled.     Fatigue  yes    Weight/appetite  Good    Constitutional  Denies F/C    Pain  Notable diffuse   Sleep  Ok    Mucositis  Denies    Cardiovascular  Denies CP    Respiratory  Denies SOB    Nausea  Denies    BMs  Normal    GI  Denies abdominal pain    Bleeding  Denies epistaxis, hemoptysis, BRBPR, melena, hematuria or any other bleeding    Extremities  Denies edema    Skin/nail/hair  Denies toxicity    Neuropathy  Denies    Psych  depressed   Misc  n/a    Exercise  none          Objective:      BP (!) 138/92 (BP Location: Right leg, Patient Position: Sitting, BP Method: Medium (Automatic))   Pulse 94   Temp 98.4 °F (36.9 °C) (Temporal)   Resp 18   Ht 5' (1.524 m)   Wt 55.8 kg (123 lb 0.3 oz)   LMP 12/12/2015 (Approximate) Comment: spotting   SpO2 98%   BMI 24.03 kg/m²   Wt Readings from Last 30 Encounters:   10/07/20 55.8  kg (123 lb 0.3 oz)   09/17/20 54.7 kg (120 lb 9.5 oz)   09/04/20 54.7 kg (120 lb 9.5 oz)   08/04/20 54.7 kg (120 lb 9.5 oz)   07/27/20 57.2 kg (126 lb 1.7 oz)   07/24/20 57.2 kg (126 lb 1.7 oz)   02/16/20 53.4 kg (117 lb 11.6 oz)   01/21/20 53.1 kg (117 lb 1 oz)   01/13/20 53.1 kg (117 lb 1 oz)   01/02/20 53.1 kg (117 lb)   11/26/19 51.7 kg (114 lb)   11/25/19 54.4 kg (120 lb)   10/14/19 54.7 kg (120 lb 9.6 oz)   05/08/19 54 kg (119 lb 1.6 oz)   08/29/18 60.3 kg (133 lb)   07/30/18 60.3 kg (133 lb)   07/13/18 59.9 kg (132 lb)   09/11/17 59.9 kg (132 lb)   12/08/16 62.4 kg (137 lb 8 oz)   08/01/16 61.7 kg (136 lb)   12/14/15 62.7 kg (138 lb 3.2 oz)     Physical exam is unchanged from prior  Constitutional: Patient is oriented to person, place, and time. No distress. Well appaering  HENT: Mouth/Throat: Oropharynx is clear and moist. No oropharyngeal exudate.   Eyes: Conjunctivae and EOM are normal.   Neck: Normal range of motion. Neck supple.   Cardiovascular: Normal rate, regular rhythm, normal heart sounds and intact distal pulses.    Pulmonary/Chest: Effort normal and breath sounds normal. no wheezes. no rales.    Abdominal: Soft. Bowel sounds are normal. Patient exhibits no distension. There is no tenderness.   Musculoskeletal: patient exhibits no edema or tenderness.   Lymphadenopathy:  patient  has no cervical adenopathy. no supraclavicular adenopathy. No axillary LAD.   Neurological: Patient is alert and oriented to person, place, and time. normal strength. No cranial nerve deficit or sensory deficit.   Skin: Skin is warm and dry. No rash noted. No erythema.   Psychiatric: Patient has a normal mood and affect. speech is normal.   Nursing note and vitals reviewed.        Assessment:       1. Malignant neoplasm of upper-outer quadrant of right breast in female, estrogen receptor positive    2. Enlarged lymph node    3. Use of tamoxifen (Nolvadex)    4. Numbness and tingling      Stage IA left breast  Stage IA  right breast     ECOG 2     Patient is a 59 y.o. year old female with new diagnosis bilateral breast cancer after right breast mass felt on SBE and identified on MMG/biopsy, now s/p Right mastectomy, and left breast cancer identified incidentally at time of prophylactic left mastectomy.       Discussed diagnosis, staging, receptor status, various treatments including surgery, chemotherapy, radiation, endocrine therapy. Discussed recommended treatment plan and benefits/risks, rationale for recommendations based on NCCN guidelines. Patient and family expressed understanding and questions answered to their satisfaction.     For this patient, low oncotype result on 8mm IDC of right breast which is good news.      Discussed pros/cons adjvuant AI minimum 5 years and re-eval, consider 10 years at that time and rationale.   Discussed side effects, benefit/risk of AI. Concern with her baseline chronic joint aches and pains, she expressed good understanding of possible SE of AI and we will re-eval closely for arthrlagia  Discussed bone health and Vit D/calcium.      Discussed benefits of exercise and healthy body weight (BMI<25) in decreasing risk of breast cancer recurrence and advised exercise and diet, she expressed understanding.        Also reviewed her labs, thrombocytosis, anemia after surgery in detail and evaluation recommended, labs.      Discussed coping with cancer diagnosis and support given.      Discussed exercise, nutrition.      05/18/2020, patient is having intolerance to aromatase inhibitor affecting quality of life and ADLs, discussed in great detail in all other medication options and rationale and as it pertains to her personal risk of recurrence which is overall low but present.  Plan to stop current AI followed by start exercise and rechallenge with aromasin in 3-4 weeks. She also hasn't obtained lab work we discussed due to pandemic, to eval her thrombocytosis/anemia, she will contact us when leaving  home and feels comfortable doing so.      7/7/20 - clinically remains HANANE, discussed intolerance to AI, new dx carpal tunnel, multiple joints/back issues OA severe.   We discussed stop AI and replace iron and re-eval tolerance.   Discussed had GI SE with oral iron, recommend IV, she is in agreement  Long discussion about all of above, anxiety, coping, support given.     8/4/20 - reviewed again multiple pain arthrlagia joint back issues on second AI. Discussed in detail.  Discussed start tamoxifen, she is in agreement. Advised on SE/benefit/risk in detail.     10/7/20 TODAY presents for f/u after start tamoxifen and overall tolerating well, reviewed ongoing med problems at length, recent bx expect benign and will follow-up, anxiety related to this and potential future surgeries. Hopefully she gets some relief soon in regards to multiple joint issues. Tamoxifen plan 5 years and re-eval        Discussed COVID-19 and social distancing in great detail, avoid all non-essential visits out of the home if possible and avoid sick contacts.       Plan:       Ricardo was seen today for follow-up.    Diagnoses and all orders for this visit:    Malignant neoplasm of upper-outer quadrant of right breast in female, estrogen receptor positive    Enlarged lymph node    Use of tamoxifen (Nolvadex)    Numbness and tingling            Will follow-up with pathology result from recent LN excision    MD vis 4 months for eval on tamoxifen.     MD vis 4-6 weeks from now in person or telemed okay to review start of tamoxifen.     Exercise/nutrition important, reviewed    Continue current medications, reviewed.      Important to keep follow-up with PCP and pain mgmt and ortho.     Discussed plan above in great detail with patient and all questions answered to their satisfaction. Proceed with plan above.          Brandi Bolaños M.D.  Hematology Oncology  [facility]

## 2020-10-12 PROBLEM — T79.2XXA POST-TRAUMATIC SEROMA: Status: ACTIVE | Noted: 2020-10-12

## 2021-02-08 ENCOUNTER — PATIENT MESSAGE (OUTPATIENT)
Dept: HEMATOLOGY/ONCOLOGY | Facility: CLINIC | Age: 61
End: 2021-02-08

## 2021-02-10 ENCOUNTER — TELEPHONE (OUTPATIENT)
Dept: HEMATOLOGY/ONCOLOGY | Facility: CLINIC | Age: 61
End: 2021-02-10

## 2021-03-01 ENCOUNTER — OFFICE VISIT (OUTPATIENT)
Dept: HEMATOLOGY/ONCOLOGY | Facility: CLINIC | Age: 61
End: 2021-03-01
Payer: COMMERCIAL

## 2021-03-01 VITALS
RESPIRATION RATE: 18 BRPM | WEIGHT: 128.5 LBS | TEMPERATURE: 98 F | SYSTOLIC BLOOD PRESSURE: 130 MMHG | HEIGHT: 60 IN | OXYGEN SATURATION: 96 % | BODY MASS INDEX: 25.23 KG/M2 | HEART RATE: 88 BPM | DIASTOLIC BLOOD PRESSURE: 78 MMHG

## 2021-03-01 DIAGNOSIS — M19.90 OSTEOARTHRITIS, UNSPECIFIED OSTEOARTHRITIS TYPE, UNSPECIFIED SITE: ICD-10-CM

## 2021-03-01 DIAGNOSIS — G56.03 BILATERAL CARPAL TUNNEL SYNDROME: ICD-10-CM

## 2021-03-01 DIAGNOSIS — Z79.810 USE OF TAMOXIFEN (NOLVADEX): ICD-10-CM

## 2021-03-01 DIAGNOSIS — R20.0 NUMBNESS AND TINGLING: ICD-10-CM

## 2021-03-01 DIAGNOSIS — Z17.0 MALIGNANT NEOPLASM OF UPPER-OUTER QUADRANT OF RIGHT BREAST IN FEMALE, ESTROGEN RECEPTOR POSITIVE: Primary | ICD-10-CM

## 2021-03-01 DIAGNOSIS — R20.2 NUMBNESS AND TINGLING: ICD-10-CM

## 2021-03-01 DIAGNOSIS — C50.411 MALIGNANT NEOPLASM OF UPPER-OUTER QUADRANT OF RIGHT BREAST IN FEMALE, ESTROGEN RECEPTOR POSITIVE: Primary | ICD-10-CM

## 2021-03-01 DIAGNOSIS — F41.9 ANXIETY: ICD-10-CM

## 2021-03-01 DIAGNOSIS — L98.9 SCALP LESION: ICD-10-CM

## 2021-03-01 PROCEDURE — 3008F BODY MASS INDEX DOCD: CPT | Mod: CPTII,S$GLB,, | Performed by: INTERNAL MEDICINE

## 2021-03-01 PROCEDURE — 3075F PR MOST RECENT SYSTOLIC BLOOD PRESS GE 130-139MM HG: ICD-10-PCS | Mod: CPTII,S$GLB,, | Performed by: INTERNAL MEDICINE

## 2021-03-01 PROCEDURE — 3075F SYST BP GE 130 - 139MM HG: CPT | Mod: CPTII,S$GLB,, | Performed by: INTERNAL MEDICINE

## 2021-03-01 PROCEDURE — 99214 PR OFFICE/OUTPT VISIT, EST, LEVL IV, 30-39 MIN: ICD-10-PCS | Mod: S$GLB,,, | Performed by: INTERNAL MEDICINE

## 2021-03-01 PROCEDURE — 99999 PR PBB SHADOW E&M-EST. PATIENT-LVL V: CPT | Mod: PBBFAC,,, | Performed by: INTERNAL MEDICINE

## 2021-03-01 PROCEDURE — 3008F PR BODY MASS INDEX (BMI) DOCUMENTED: ICD-10-PCS | Mod: CPTII,S$GLB,, | Performed by: INTERNAL MEDICINE

## 2021-03-01 PROCEDURE — 1125F AMNT PAIN NOTED PAIN PRSNT: CPT | Mod: S$GLB,,, | Performed by: INTERNAL MEDICINE

## 2021-03-01 PROCEDURE — 1125F PR PAIN SEVERITY QUANTIFIED, PAIN PRESENT: ICD-10-PCS | Mod: S$GLB,,, | Performed by: INTERNAL MEDICINE

## 2021-03-01 PROCEDURE — 99999 PR PBB SHADOW E&M-EST. PATIENT-LVL V: ICD-10-PCS | Mod: PBBFAC,,, | Performed by: INTERNAL MEDICINE

## 2021-03-01 PROCEDURE — 99214 OFFICE O/P EST MOD 30 MIN: CPT | Mod: S$GLB,,, | Performed by: INTERNAL MEDICINE

## 2021-03-01 PROCEDURE — 3078F DIAST BP <80 MM HG: CPT | Mod: CPTII,S$GLB,, | Performed by: INTERNAL MEDICINE

## 2021-03-01 PROCEDURE — 3078F PR MOST RECENT DIASTOLIC BLOOD PRESSURE < 80 MM HG: ICD-10-PCS | Mod: CPTII,S$GLB,, | Performed by: INTERNAL MEDICINE

## 2021-03-02 ENCOUNTER — TELEPHONE (OUTPATIENT)
Dept: DERMATOLOGY | Facility: CLINIC | Age: 61
End: 2021-03-02

## 2021-03-03 ENCOUNTER — PATIENT MESSAGE (OUTPATIENT)
Dept: HEMATOLOGY/ONCOLOGY | Facility: CLINIC | Age: 61
End: 2021-03-03

## 2021-05-10 ENCOUNTER — PATIENT MESSAGE (OUTPATIENT)
Dept: RESEARCH | Facility: HOSPITAL | Age: 61
End: 2021-05-10

## 2021-05-21 ENCOUNTER — TELEPHONE (OUTPATIENT)
Dept: PSYCHIATRY | Facility: CLINIC | Age: 61
End: 2021-05-21

## 2021-08-02 ENCOUNTER — OFFICE VISIT (OUTPATIENT)
Dept: HEMATOLOGY/ONCOLOGY | Facility: CLINIC | Age: 61
End: 2021-08-02
Payer: COMMERCIAL

## 2021-08-02 VITALS
TEMPERATURE: 98 F | SYSTOLIC BLOOD PRESSURE: 110 MMHG | HEIGHT: 60 IN | WEIGHT: 126.31 LBS | HEART RATE: 91 BPM | OXYGEN SATURATION: 97 % | DIASTOLIC BLOOD PRESSURE: 80 MMHG | BODY MASS INDEX: 24.8 KG/M2

## 2021-08-02 DIAGNOSIS — Z17.0 MALIGNANT NEOPLASM OF UPPER-OUTER QUADRANT OF RIGHT BREAST IN FEMALE, ESTROGEN RECEPTOR POSITIVE: Primary | ICD-10-CM

## 2021-08-02 DIAGNOSIS — R52 DIFFUSE PAIN: ICD-10-CM

## 2021-08-02 DIAGNOSIS — C50.411 MALIGNANT NEOPLASM OF UPPER-OUTER QUADRANT OF RIGHT BREAST IN FEMALE, ESTROGEN RECEPTOR POSITIVE: Primary | ICD-10-CM

## 2021-08-02 DIAGNOSIS — G56.03 BILATERAL CARPAL TUNNEL SYNDROME: ICD-10-CM

## 2021-08-02 DIAGNOSIS — R20.2 NUMBNESS AND TINGLING: ICD-10-CM

## 2021-08-02 DIAGNOSIS — R20.0 NUMBNESS AND TINGLING: ICD-10-CM

## 2021-08-02 DIAGNOSIS — T38.6X5D ADVERSE EFFECT OF TAMOXIFEN, SUBSEQUENT ENCOUNTER: ICD-10-CM

## 2021-08-02 DIAGNOSIS — F41.9 ANXIETY: ICD-10-CM

## 2021-08-02 DIAGNOSIS — Z79.810 USE OF TAMOXIFEN (NOLVADEX): ICD-10-CM

## 2021-08-02 PROCEDURE — 3008F BODY MASS INDEX DOCD: CPT | Mod: CPTII,S$GLB,, | Performed by: INTERNAL MEDICINE

## 2021-08-02 PROCEDURE — 3079F DIAST BP 80-89 MM HG: CPT | Mod: CPTII,S$GLB,, | Performed by: INTERNAL MEDICINE

## 2021-08-02 PROCEDURE — 1159F PR MEDICATION LIST DOCUMENTED IN MEDICAL RECORD: ICD-10-PCS | Mod: CPTII,S$GLB,, | Performed by: INTERNAL MEDICINE

## 2021-08-02 PROCEDURE — 99999 PR PBB SHADOW E&M-EST. PATIENT-LVL IV: ICD-10-PCS | Mod: PBBFAC,,, | Performed by: INTERNAL MEDICINE

## 2021-08-02 PROCEDURE — 1159F MED LIST DOCD IN RCRD: CPT | Mod: CPTII,S$GLB,, | Performed by: INTERNAL MEDICINE

## 2021-08-02 PROCEDURE — 1126F AMNT PAIN NOTED NONE PRSNT: CPT | Mod: CPTII,S$GLB,, | Performed by: INTERNAL MEDICINE

## 2021-08-02 PROCEDURE — 99999 PR PBB SHADOW E&M-EST. PATIENT-LVL IV: CPT | Mod: PBBFAC,,, | Performed by: INTERNAL MEDICINE

## 2021-08-02 PROCEDURE — 1126F PR PAIN SEVERITY QUANTIFIED, NO PAIN PRESENT: ICD-10-PCS | Mod: CPTII,S$GLB,, | Performed by: INTERNAL MEDICINE

## 2021-08-02 PROCEDURE — 3074F SYST BP LT 130 MM HG: CPT | Mod: CPTII,S$GLB,, | Performed by: INTERNAL MEDICINE

## 2021-08-02 PROCEDURE — 99214 PR OFFICE/OUTPT VISIT, EST, LEVL IV, 30-39 MIN: ICD-10-PCS | Mod: S$GLB,,, | Performed by: INTERNAL MEDICINE

## 2021-08-02 PROCEDURE — 3008F PR BODY MASS INDEX (BMI) DOCUMENTED: ICD-10-PCS | Mod: CPTII,S$GLB,, | Performed by: INTERNAL MEDICINE

## 2021-08-02 PROCEDURE — 99214 OFFICE O/P EST MOD 30 MIN: CPT | Mod: S$GLB,,, | Performed by: INTERNAL MEDICINE

## 2021-08-02 PROCEDURE — 3079F PR MOST RECENT DIASTOLIC BLOOD PRESSURE 80-89 MM HG: ICD-10-PCS | Mod: CPTII,S$GLB,, | Performed by: INTERNAL MEDICINE

## 2021-08-02 PROCEDURE — 3074F PR MOST RECENT SYSTOLIC BLOOD PRESSURE < 130 MM HG: ICD-10-PCS | Mod: CPTII,S$GLB,, | Performed by: INTERNAL MEDICINE

## 2021-08-03 ENCOUNTER — PATIENT MESSAGE (OUTPATIENT)
Dept: HEMATOLOGY/ONCOLOGY | Facility: CLINIC | Age: 61
End: 2021-08-03

## 2021-08-03 ENCOUNTER — TELEPHONE (OUTPATIENT)
Dept: PSYCHIATRY | Facility: CLINIC | Age: 61
End: 2021-08-03

## 2021-08-06 ENCOUNTER — TELEPHONE (OUTPATIENT)
Dept: HEMATOLOGY/ONCOLOGY | Facility: CLINIC | Age: 61
End: 2021-08-06

## 2021-09-24 DIAGNOSIS — R52 DIFFUSE PAIN: Primary | ICD-10-CM

## 2021-10-08 ENCOUNTER — TELEPHONE (OUTPATIENT)
Dept: PSYCHIATRY | Facility: CLINIC | Age: 61
End: 2021-10-08

## 2021-10-18 ENCOUNTER — TELEPHONE (OUTPATIENT)
Dept: HEMATOLOGY/ONCOLOGY | Facility: CLINIC | Age: 61
End: 2021-10-18

## 2023-01-31 PROBLEM — D47.3 ESSENTIAL (HEMORRHAGIC) THROMBOCYTHEMIA: Status: ACTIVE | Noted: 2023-01-31

## 2023-01-31 PROBLEM — F32.4 MAJOR DEPRESSIVE DISORDER WITH SINGLE EPISODE, IN PARTIAL REMISSION: Status: ACTIVE | Noted: 2023-01-31

## 2023-06-06 ENCOUNTER — PATIENT MESSAGE (OUTPATIENT)
Dept: DERMATOLOGY | Facility: CLINIC | Age: 63
End: 2023-06-06
Payer: COMMERCIAL

## 2023-06-19 PROBLEM — Z82.49 FAMILY HISTORY OF CORONARY ARTERY DISEASE: Status: ACTIVE | Noted: 2023-06-19

## 2023-07-03 PROBLEM — Z13.6 ENCOUNTER FOR SCREENING FOR CARDIOVASCULAR DISORDERS: Status: ACTIVE | Noted: 2023-07-03

## 2023-10-02 PROBLEM — Z13.6 ENCOUNTER FOR SCREENING FOR CARDIOVASCULAR DISORDERS: Status: RESOLVED | Noted: 2023-07-03 | Resolved: 2023-10-02

## 2024-03-22 PROBLEM — D47.3 ESSENTIAL (HEMORRHAGIC) THROMBOCYTHEMIA: Status: RESOLVED | Noted: 2023-01-31 | Resolved: 2024-03-22
